# Patient Record
Sex: FEMALE
[De-identification: names, ages, dates, MRNs, and addresses within clinical notes are randomized per-mention and may not be internally consistent; named-entity substitution may affect disease eponyms.]

---

## 2017-06-28 ENCOUNTER — HOSPITAL ENCOUNTER (EMERGENCY)
Dept: HOSPITAL 5 - ED | Age: 72
Discharge: HOME | End: 2017-06-28
Payer: MEDICARE

## 2017-06-28 VITALS — SYSTOLIC BLOOD PRESSURE: 174 MMHG | DIASTOLIC BLOOD PRESSURE: 98 MMHG

## 2017-06-28 DIAGNOSIS — E78.00: ICD-10-CM

## 2017-06-28 DIAGNOSIS — R42: ICD-10-CM

## 2017-06-28 DIAGNOSIS — Z87.891: ICD-10-CM

## 2017-06-28 DIAGNOSIS — I10: Primary | ICD-10-CM

## 2017-06-28 LAB
ANION GAP SERPL CALC-SCNC: 18 MMOL/L
BASOPHILS NFR BLD AUTO: 0.6 % (ref 0–1.8)
BILIRUB UR QL STRIP: (no result)
BLOOD UR QL VISUAL: (no result)
BUN SERPL-MCNC: 12 MG/DL (ref 7–17)
BUN/CREAT SERPL: 24 %
CALCIUM SERPL-MCNC: 9.4 MG/DL (ref 8.4–10.2)
CHLORIDE SERPL-SCNC: 98.9 MMOL/L (ref 98–107)
CO2 SERPL-SCNC: 29 MMOL/L (ref 22–30)
EOSINOPHIL NFR BLD AUTO: 0.7 % (ref 0–4.3)
GLUCOSE SERPL-MCNC: 122 MG/DL (ref 65–100)
HCT VFR BLD CALC: 44.2 % (ref 30.3–42.9)
HGB BLD-MCNC: 14.4 GM/DL (ref 10.1–14.3)
KETONES UR STRIP-MCNC: (no result) MG/DL
LEUKOCYTE ESTERASE UR QL STRIP: (no result)
MCH RBC QN AUTO: 31 PG (ref 28–32)
MCHC RBC AUTO-ENTMCNC: 33 % (ref 30–34)
MCV RBC AUTO: 95 FL (ref 79–97)
MUCOUS THREADS #/AREA URNS HPF: (no result) /HPF
NITRITE UR QL STRIP: (no result)
PH UR STRIP: 7 [PH] (ref 5–7)
PLATELET # BLD: 169 K/MM3 (ref 140–440)
POTASSIUM SERPL-SCNC: 5.3 MMOL/L (ref 3.6–5)
PROT UR STRIP-MCNC: (no result) MG/DL
RBC # BLD AUTO: 4.67 M/MM3 (ref 3.65–5.03)
RBC #/AREA URNS HPF: 2 /HPF (ref 0–6)
SODIUM SERPL-SCNC: 141 MMOL/L (ref 137–145)
UROBILINOGEN UR-MCNC: < 2 MG/DL (ref ?–2)
WBC # BLD AUTO: 9.1 K/MM3 (ref 4.5–11)
WBC #/AREA URNS HPF: 1 /HPF (ref 0–6)

## 2017-06-28 PROCEDURE — 93005 ELECTROCARDIOGRAM TRACING: CPT

## 2017-06-28 PROCEDURE — 99283 EMERGENCY DEPT VISIT LOW MDM: CPT

## 2017-06-28 PROCEDURE — 36415 COLL VENOUS BLD VENIPUNCTURE: CPT

## 2017-06-28 PROCEDURE — 81001 URINALYSIS AUTO W/SCOPE: CPT

## 2017-06-28 PROCEDURE — 80048 BASIC METABOLIC PNL TOTAL CA: CPT

## 2017-06-28 PROCEDURE — 93010 ELECTROCARDIOGRAM REPORT: CPT

## 2017-06-28 PROCEDURE — 85025 COMPLETE CBC W/AUTO DIFF WBC: CPT

## 2017-06-28 NOTE — ADMIT CRITERIA FORM
Admission Criteria Documentation: 





                                                   HYPERTENSION





Clinical Indications for Admission to Inpatient Care


                                                    


                                                     


                                                                             (

[place]/check or initial the applicable condition/criteria)


                                                                       


Admission is indicated for 1 or more of the following(1)(2)(3)(4)(5)(6)(7)(8)(9)

(10):


[ ]I.   Hypertensive emergency, with evidence of acute and progressing target 

organ disease as indicated by


       1 or more of the following:


        [ ]a)          Hypertensive encephalopathy (e.g., confusion, altered 

mental status) (11)


        [ ]b)          Cerebral infarction


        [ ]c)   Intracranial hemorrhage


        [ ]d)   Myocardial ischemia or infarction


        [ ]e)   Heart failure (eg. Pulmonary edema)


        [ ]f)   Aortic dissection


        [ ]g)   Increased creatinine (new) with reduction of more than 50% in 

estimated glomerular filtration rate


      from baseline


        [ ]h)   Seizure


        [ ]i)   Papilledema


        [ ]j)   Retinal hemorrhage


        [ ]k)   Microangiopathic hemolytic anemia


        [ ]l)   Other significant finding secondary to hypertension


[ ]II.  Adrenergic or sympathomimetic crisis (e.g., severe hypertension due to 

pheochromocytoma crisis,


       cocaine, phencyclindine, or amphetamine intoxication, or clonidine 

withdrawal)


[X ]III.  Severe hypertension (SBP greater than 180 mmHg or DBP greater than 

110 mmHg or greater than the


        95th percentile for age, gender, and height in pediatric patients) that 

cannot be controlled (e.g., to SBP


        less than 160 mmHg and DBP less than 100 mmHg in adults) by treatment 

with oral medication in


        emergency department or observation care (12)











Extended stay beyond goal length of staymay be needed for(21)(22):


[ ]a)    Persistent hypertensive encephalopathy


[ ]b)    Continuation of pulmonary edema


[ ]c)    Recurring or persistent severe hypertension


[ ]d)    Target organ damage (eg, angina, stroke, aortic dissection)











The original [employer] content created by [employer] has been revised. 


The portions of the content which have been revised are identified through the 

use of italic text or in bold, and [employer] 


has neither reviewed nor approved the modified material. All other unmodified 

content is copyright  [employer].





Please see references footnoted in the original [employer] edition 

2016

## 2017-06-28 NOTE — EMERGENCY DEPARTMENT REPORT
ED Dizziness HPI





- General


Chief Complaint: Dizziness


Stated Complaint: B/P/DIZZINESS


Time Seen by Provider: 06/28/17 17:12


Source: patient


Mode of arrival: Ambulatory


Limitations: No Limitations





- History of Present Illness


Initial Comments: 





Pt is a 72 yr old F with a h/o HTN, HLD, and depression who presents with 

dizziness. Pt reports she had one episode of dizziness last night and another 

episode today. Associated nausea, no vomiting. Pt describes the dizziness as 

room spinning. Pt has not taken her medications today. Currently her symptoms 

have resolved. Otherwise no fevers, chills, HA, vomiting, CP, SOB, abd pain, 

falls, syncope, palpitations, extremity pains/swelling, travel, or sick contacts





- Related Data


 Home Medications











 Medication  Instructions  Recorded  Confirmed  Last Taken


 


Carvedilol [Coreg] 3.125 mg PO BID 08/06/15 06/28/17 06/27/17


 


Lisinopril [Zestril TAB] 5 mg PO QHS 08/06/15 06/28/17 06/27/17


 


Lisinopril/Hydrochlorothiazide 10 - 12.5 mg PO DAILY 08/06/15 06/28/17 06/27/17





[Zestoretic 10-12.5 mg]    


 


Lovastatin Tab [Mevacor] 10 mg PO QPM 08/06/15 06/28/17 06/27/17


 


Sertraline [Zoloft] 25 mg PO DAILY 08/06/15 06/28/17 06/27/17


 


Zolpidem [Ambien] 5 mg PO QHS 08/06/15 06/28/17 06/27/17








 Previous Rx's











 Medication  Instructions  Recorded  Last Taken  Type


 


Meclizine [Antivert] 25 mg PO TID PRN #20 tablet 06/28/17 Unknown Rx











 Allergies











Allergy/AdvReac Type Severity Reaction Status Date / Time


 


codeine AdvReac  Dizziness Verified 06/28/17 14:21














ED Review of Systems


ROS: 


Stated complaint: B/P/DIZZINESS


Other details as noted in HPI





Comment: All other systems reviewed and negative





ED Past Medical Hx





- Past Medical History


Hx Hypertension: Yes


Additional medical history: high cholesterol





- Surgical History


Additional Surgical History: TUBAL LIGATION





- Social History


Smoking Status: Former Smoker


Substance Use Type: None





- Medications


Home Medications: 


 Home Medications











 Medication  Instructions  Recorded  Confirmed  Last Taken  Type


 


Carvedilol [Coreg] 3.125 mg PO BID 08/06/15 06/28/17 06/27/17 History


 


Lisinopril [Zestril TAB] 5 mg PO QHS 08/06/15 06/28/17 06/27/17 History


 


Lisinopril/Hydrochlorothiazide 10 - 12.5 mg PO DAILY 08/06/15 06/28/17 06/27/17 

History





[Zestoretic 10-12.5 mg]     


 


Lovastatin Tab [Mevacor] 10 mg PO QPM 08/06/15 06/28/17 06/27/17 History


 


Sertraline [Zoloft] 25 mg PO DAILY 08/06/15 06/28/17 06/27/17 History


 


Zolpidem [Ambien] 5 mg PO QHS 08/06/15 06/28/17 06/27/17 History


 


Meclizine [Antivert] 25 mg PO TID PRN #20 tablet 06/28/17  Unknown Rx














ED Physical Exam





- General


Limitations: No Limitations


General appearance: alert, in no apparent distress





- Head


Head exam: Present: atraumatic, normocephalic





- Eye


Eye exam: Present: normal appearance





- ENT


ENT exam: Present: mucous membranes moist





- Neck


Neck exam: Present: normal inspection





- Respiratory


Respiratory exam: Present: normal lung sounds bilaterally.  Absent: respiratory 

distress





- Cardiovascular


Cardiovascular Exam: Present: regular rate, normal rhythm.  Absent: systolic 

murmur, diastolic murmur, rubs, gallop





- GI/Abdominal


GI/Abdominal exam: Present: soft, normal bowel sounds





- Extremities Exam


Extremities exam: Present: normal inspection





- Back Exam


Back exam: Present: normal inspection





- Neurological Exam


Neurological exam: Present: alert, oriented X3, CN II-XII intact, normal gait, 

reflexes normal, other (Walking in the ED without neurological dysfunction).  

Absent: motor sensory deficit





- Psychiatric


Psychiatric exam: Present: normal affect, normal mood





- Skin


Skin exam: Present: warm, dry, intact, normal color.  Absent: rash





ED Course


 Vital Signs











  06/28/17 06/28/17 06/28/17





  14:00 14:33 14:54


 


Temperature 97.6 F  


 


Pulse Rate 71 71 


 


Respiratory 17  





Rate   


 


Blood Pressure 224/118 224/118 


 


O2 Sat by Pulse 93  97





Oximetry   














  06/28/17 06/28/17 06/28/17





  15:00 15:05 15:10


 


Temperature   


 


Pulse Rate 76  73


 


Respiratory  18 21





Rate   


 


Blood Pressure 228/125  228/125


 


O2 Sat by Pulse  100 





Oximetry   














  06/28/17 06/28/17 06/28/17





  15:20 15:30 15:40


 


Temperature   


 


Pulse Rate 71 71 69


 


Respiratory 13 12 17





Rate   


 


Blood Pressure 196/105 196/105 186/102


 


O2 Sat by Pulse   





Oximetry   














  06/28/17 06/28/17 06/28/17





  15:50 16:00 16:10


 


Temperature   


 


Pulse Rate 67 69 70


 


Respiratory 15 18 16





Rate   


 


Blood Pressure 228/125 182/109 182/109


 


O2 Sat by Pulse   





Oximetry   














  06/28/17 06/28/17 06/28/17





  16:20 16:30 16:40


 


Temperature   


 


Pulse Rate 70 75 69


 


Respiratory 17 17 12





Rate   


 


Blood Pressure 188/106 188/106 185/110


 


O2 Sat by Pulse  96 95





Oximetry   














  06/28/17 06/28/17 06/28/17





  16:50 17:00 17:10


 


Temperature   


 


Pulse Rate 69 77 68


 


Respiratory 21 22 17





Rate   


 


Blood Pressure 185/110 189/106 189/106


 


O2 Sat by Pulse 93 94 96





Oximetry   














  06/28/17 06/28/17 06/28/17





  17:20 17:30 17:37


 


Temperature   


 


Pulse Rate 70 70 67


 


Respiratory 15 16 





Rate   


 


Blood Pressure 202/108 191/104 191/104


 


O2 Sat by Pulse 94 97 





Oximetry   














  06/28/17 06/28/17 06/28/17





  17:40 17:50 18:00


 


Temperature   


 


Pulse Rate 69 70 69


 


Respiratory 14 13 13





Rate   


 


Blood Pressure 178/103 178/103 181/111


 


O2 Sat by Pulse 95 96 96





Oximetry   














ED Medical Decision Making





- Lab Data


Result diagrams: 


 06/28/17 15:13





 06/28/17 15:13





- EKG Data


-: EKG Interpreted by Me





- EKG Data





06/28/17 17:16


EKG 1410 normal sinus rhythm at 68 bpm,  ms, normal axis, no LVH, no ST 

changes, no STEMI





- Medical Decision Making





Pt's K: 5.3 with no EKG changes for hyperkalemia





Currently asymptomatic and has normal gait and neuro exam, no need for head CT. 

Ordered meclizine 25mg PO





Pt's BP improved to 174/93 with lisinopril 10mg





Instructed patient to take her BP meds at home and to follow up with her PMD


Critical care attestation.: 


If time is entered above; I have spent that time in minutes in the direct care 

of this critically ill patient, excluding procedure time.








ED Disposition


Clinical Impression: 


 Dizziness, HTN (hypertension)





Disposition: DC-01 TO HOME OR SELFCARE


Is pt being admited?: No


Condition: Stable


Instructions:  Hypertension (ED), Vertigo (ED), Dizziness (ED)


Prescriptions: 


Meclizine [Antivert] 25 mg PO TID PRN #20 tablet


 PRN Reason: Vertigo


Referrals: 


PRIMARY CARE,MD [Primary Care Provider] - 3-5 Days

## 2017-08-22 ENCOUNTER — HOSPITAL ENCOUNTER (OUTPATIENT)
Dept: HOSPITAL 5 - CATHLABREC | Age: 72
Discharge: HOME | End: 2017-08-22
Attending: INTERNAL MEDICINE
Payer: MEDICARE

## 2017-08-22 VITALS — SYSTOLIC BLOOD PRESSURE: 128 MMHG | DIASTOLIC BLOOD PRESSURE: 89 MMHG

## 2017-08-22 DIAGNOSIS — Z79.899: ICD-10-CM

## 2017-08-22 DIAGNOSIS — Z80.1: ICD-10-CM

## 2017-08-22 DIAGNOSIS — R94.39: Primary | ICD-10-CM

## 2017-08-22 DIAGNOSIS — Z83.6: ICD-10-CM

## 2017-08-22 DIAGNOSIS — E66.9: ICD-10-CM

## 2017-08-22 DIAGNOSIS — E78.5: ICD-10-CM

## 2017-08-22 DIAGNOSIS — Z88.5: ICD-10-CM

## 2017-08-22 DIAGNOSIS — Z79.82: ICD-10-CM

## 2017-08-22 DIAGNOSIS — I25.10: ICD-10-CM

## 2017-08-22 DIAGNOSIS — I10: ICD-10-CM

## 2017-08-22 PROCEDURE — 93458 L HRT ARTERY/VENTRICLE ANGIO: CPT

## 2017-08-22 PROCEDURE — 92920 PRQ TRLUML C ANGIOP 1ART&/BR: CPT

## 2017-08-22 PROCEDURE — C1887 CATHETER, GUIDING: HCPCS

## 2017-08-22 PROCEDURE — C1894 INTRO/SHEATH, NON-LASER: HCPCS

## 2017-08-22 PROCEDURE — 93010 ELECTROCARDIOGRAM REPORT: CPT

## 2017-08-22 PROCEDURE — 93005 ELECTROCARDIOGRAM TRACING: CPT

## 2017-08-22 PROCEDURE — 85347 COAGULATION TIME ACTIVATED: CPT

## 2017-08-22 PROCEDURE — C1769 GUIDE WIRE: HCPCS

## 2017-08-22 RX ADMIN — HEPARIN SODIUM ONE UNIT: 1000 INJECTION, SOLUTION INTRAVENOUS; SUBCUTANEOUS at 08:54

## 2017-08-22 RX ADMIN — HEPARIN SODIUM ONE UNIT: 1000 INJECTION, SOLUTION INTRAVENOUS; SUBCUTANEOUS at 09:10

## 2017-08-22 NOTE — SHORT STAY SUMMARY
Short Stay Documentation


Date of service: 08/22/17





- History


H&P: obtained from office





- Allergies and Medications


Current Medications: 


 Allergies





codeine Adverse Reaction (Verified 06/28/17 14:21)


 Dizziness





 Home Medications











 Medication  Instructions  Recorded  Confirmed  Last Taken  Type


 


Carvedilol [Coreg] 12.5 mg PO BID 08/06/15 08/22/17 08/22/17 05:30 History


 


Lisinopril [Zestril TAB] 20 mg PO QPM 08/06/15 08/22/17 08/21/17 History





    b 


 


Lisinopril/Hydrochlorothiazide 20 - 25 mg PO QAM 08/06/15 06/28/17 08/22/17 05:

30 History





[Zestoretic 10-12.5 mg]     


 


Lovastatin Tab [Mevacor] 20 mg PO QPM 08/06/15 08/22/17 08/21/17 History


 


Sertraline [Zoloft] 50 mg PO DAILY 08/06/15 08/22/17 08/21/17 History


 


Zolpidem [Ambien] 10 mg PO QHS 08/06/15 08/22/17 08/21/17 History


 


Meclizine [Antivert] 25 mg PO TID PRN #20 tablet 06/28/17  Unknown Rx


 


Aspirin [Adult Low Dose Aspirin EC] 81 mg PO DAILY 08/22/17 08/22/17 08/21/17 

History








Active Medications





Sodium Chloride (Nacl 0.9% 500 Ml)  500 mls @ 50 mls/hr IV AS DIRECT BRANDY


   Stop: 08/22/17 16:59


   Last Admin: 08/22/17 07:16 Dose:  50 mls/hr











- Brief post op/procedure progress note


Date of procedure: 08/22/17


Pre-op diagnosis: abnormal stress test


Post-op diagnosis: same


Procedure: 





C - see cath report


Anesthesia: local


Estimated blood loss: none


Condition: stable





- Disposition


Condition at discharge: Stable


Disposition: DC-01 TO HOME OR SELFCARE





- Discharge Diagnoses


(1) Abnormal stress test


Status: Suspected   





(2) Hypertension


Status: Chronic   


Qualifiers: 


   Hypertension type: H 





(3) Hyperlipidemia


Status: Chronic   


Qualifiers: 


   Hyperlipidemia type: H 





(4) Obesity


Status: Chronic   


Qualifiers: 


   Obesity type: O   Obesity classification: O   Serious obesity comorbidity 

presence: S   Body mass index: B 





(5) Dyspnea on exertion


Status: Chronic   





(6) CAD (coronary artery disease)


Status: Chronic   


Qualifiers: 


   Coronary Disease-Associated Artery/Lesion type: C   Native vs. transplanted 

heart: N   Associated angina: A 





Short Stay Discharge Plan


Activity: advance as tolerated


Diet: low fat, low cholesterol, low salt


Wound: open to air, keep clean and dry, per your surgeon's advice


Follow up with: 


LOIS NAVARRO MD [Staff Physician] - 7 Days ([place] office, 9/6/2017 @ 

10:45AM )

## 2017-08-22 NOTE — CARDIAC CATHERIZATION REPORT
LEFT HEART CATHETERIZATION 



REFERRING PHYSICIAN:  Navjot Aguilera MD 



INDICATION FOR PROCEDURE:  The patient is a pleasant 72-year-old 

female with a history of chest pain, abnormal stress test, referred for left

heart catheterization.  Risks, benefits, and potential alternatives explained at

length prior to obtaining informed consent.



PROCEDURE IN DETAIL:  The patient was brought to the cath lab in a

postabsorptive state, prepped and draped in sterile fashion.  Daniel's test in

right hand was normal.  A 2 mL of 2% lidocaine used to anesthetize the right

wrist.  A standard 6-[place]oven[place] hydrophilic sheath used to cannulate the right

radial artery via modified Seldinger technique.  All exchanges performed to

exchange a J-tip guidewire.  JL3.5 catheter used to engage the left main.  No

dampening or ventricularization.  Cineangiography performed in all projections. 

JR4 catheter used to cross the aortic valve under fluoroscopic guidance.  Left

ventriculography performed in 30 RIDDLE and 30 [place] projection via hand injections. 

Catheter flushed.  Manual pullback performed with continuous pressure

monitoring.  Catheter used to engage the right coronary.  No dampening or

ventricularization.  Cineangiography performed in all projections.  Next,

catheter removed from the body of wire.



DATA:  Aortic pressure is 150/70, LV pressure is 150, LVEDP of 15 mmHg.  Left

ventriculography revealed normal systolic performance with estimated ejection

fraction of 55% to 60%.  No evidence of aortic stenosis.  The patient remained

in normal sinus rhythm throughout the procedure.



CORONARY ANATOMY:  It is a codominant system.  Left main without significant

disease, bifurcates into left anterior descending and left circumflex.  Left

circumflex is a moderate sized vessel, courses AV groove, _____ small left PDA. 

No significant disease.  LAD is a moderate sized vessel, courses anterior

intergroove, wraps around the apex, no significant disease in the LAD or

diagonal system.  Right coronary is a small to moderate sized vessel, courses

the AV groove, distally bifurcates in the posterior descending and

posterolateral branches.  There is an ulcerated subtotal occlusion of the distal

right coronary with CHASITY 2 flow in the distal right PDA.  This is a bizarre

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  It does not appear like a

usual chronic total occlusion.  No collaterals from the left are identified. 

Given this, we decided to proceed with a PCI attempt.  A JR4 guide used to

engage the right coronary.  No difficulty.  No dampening.  Heparin given. 

Abnormal ACT confirmed.  I used multiple wires to cross the lesion without

success.  The lesion was very tortuous and from a wire standpoint behaved like a

chronic total occlusion.  There were no complications.  The patient remained

chest pain free.  CHASITY 2 flow remains in the vessel.  



At this point, given unsuccessful attempt PCI despite multiple try to optimize

medical therapy, discussed with the patient's family and referring physician,

Dr. Agiulera should she fail intensified medical therapy, would consider repeat

reattempt at [facility] for chronic total occlusion.  The patient is

clinically stable, chest pain free.  Follow up with Dr. Aguilera in 2 weeks.





DD: 08/22/2017 09:27

DT: 08/22/2017 10:23

JOB# [id number]  [id number]

SBLUX/LAURA

## 2020-07-29 ENCOUNTER — HOSPITAL ENCOUNTER (OUTPATIENT)
Dept: HOSPITAL 5 - ED | Age: 75
Setting detail: OBSERVATION
LOS: 3 days | Discharge: HOME HEALTH | End: 2020-08-01
Attending: INTERNAL MEDICINE | Admitting: INTERNAL MEDICINE
Payer: MEDICARE

## 2020-07-29 DIAGNOSIS — I25.10: ICD-10-CM

## 2020-07-29 DIAGNOSIS — E78.5: ICD-10-CM

## 2020-07-29 DIAGNOSIS — J45.902: ICD-10-CM

## 2020-07-29 DIAGNOSIS — Z98.51: ICD-10-CM

## 2020-07-29 DIAGNOSIS — Z79.82: ICD-10-CM

## 2020-07-29 DIAGNOSIS — F41.9: ICD-10-CM

## 2020-07-29 DIAGNOSIS — E66.9: ICD-10-CM

## 2020-07-29 DIAGNOSIS — N17.9: ICD-10-CM

## 2020-07-29 DIAGNOSIS — M19.90: ICD-10-CM

## 2020-07-29 DIAGNOSIS — J96.01: Primary | ICD-10-CM

## 2020-07-29 DIAGNOSIS — K21.9: ICD-10-CM

## 2020-07-29 DIAGNOSIS — I10: ICD-10-CM

## 2020-07-29 LAB
ALBUMIN SERPL-MCNC: 3.9 G/DL (ref 3.9–5)
ALT SERPL-CCNC: 15 UNITS/L (ref 7–56)
BASOPHILS # (AUTO): 0 K/MM3 (ref 0–0.1)
BASOPHILS NFR BLD AUTO: 0.5 % (ref 0–1.8)
BUN SERPL-MCNC: 11 MG/DL (ref 7–17)
BUN/CREAT SERPL: 8 %
CALCIUM SERPL-MCNC: 9.3 MG/DL (ref 8.4–10.2)
EOSINOPHIL # BLD AUTO: 0.1 K/MM3 (ref 0–0.4)
EOSINOPHIL NFR BLD AUTO: 1.4 % (ref 0–4.3)
HCT VFR BLD CALC: 41.7 % (ref 30.3–42.9)
HEMOLYSIS INDEX: 6
HGB BLD-MCNC: 14.3 GM/DL (ref 10.1–14.3)
LYMPHOCYTES # BLD AUTO: 0.8 K/MM3 (ref 1.2–5.4)
LYMPHOCYTES NFR BLD AUTO: 9.6 % (ref 13.4–35)
MCHC RBC AUTO-ENTMCNC: 34 % (ref 30–34)
MCV RBC AUTO: 95 FL (ref 79–97)
MONOCYTES # (AUTO): 0.7 K/MM3 (ref 0–0.8)
MONOCYTES % (AUTO): 7.6 % (ref 0–7.3)
PLATELET # BLD: 159 K/MM3 (ref 140–440)
RBC # BLD AUTO: 4.39 M/MM3 (ref 3.65–5.03)

## 2020-07-29 PROCEDURE — 85379 FIBRIN DEGRADATION QUANT: CPT

## 2020-07-29 PROCEDURE — 36415 COLL VENOUS BLD VENIPUNCTURE: CPT

## 2020-07-29 PROCEDURE — 85025 COMPLETE CBC W/AUTO DIFF WBC: CPT

## 2020-07-29 PROCEDURE — 82550 ASSAY OF CK (CPK): CPT

## 2020-07-29 PROCEDURE — 71045 X-RAY EXAM CHEST 1 VIEW: CPT

## 2020-07-29 PROCEDURE — 84484 ASSAY OF TROPONIN QUANT: CPT

## 2020-07-29 PROCEDURE — 82553 CREATINE MB FRACTION: CPT

## 2020-07-29 PROCEDURE — 94644 CONT INHLJ TX 1ST HOUR: CPT

## 2020-07-29 PROCEDURE — 96374 THER/PROPH/DIAG INJ IV PUSH: CPT

## 2020-07-29 PROCEDURE — 80048 BASIC METABOLIC PNL TOTAL CA: CPT

## 2020-07-29 PROCEDURE — 96376 TX/PRO/DX INJ SAME DRUG ADON: CPT

## 2020-07-29 PROCEDURE — G0378 HOSPITAL OBSERVATION PER HR: HCPCS

## 2020-07-29 PROCEDURE — 96372 THER/PROPH/DIAG INJ SC/IM: CPT

## 2020-07-29 PROCEDURE — 99291 CRITICAL CARE FIRST HOUR: CPT

## 2020-07-29 PROCEDURE — 80053 COMPREHEN METABOLIC PANEL: CPT

## 2020-07-29 PROCEDURE — 94760 N-INVAS EAR/PLS OXIMETRY 1: CPT

## 2020-07-29 PROCEDURE — 94640 AIRWAY INHALATION TREATMENT: CPT

## 2020-07-29 PROCEDURE — 93005 ELECTROCARDIOGRAM TRACING: CPT

## 2020-07-29 NOTE — EMERGENCY DEPARTMENT REPORT
ED Shortness of Breath HPI





- General


Stated Complaint: DIFFICULTY BREATHING


Time Seen by Provider: 07/29/20 22:08


Source: patient, EMS


Mode of arrival: Stretcher


Limitations: No Limitations





- History of Present Illness


Initial Comments: 





Patient is a 75-year-old female that presents emergency room with complaints of 

shortness of breath.  Patient states she is been short of breath for a few 

months and has been seeing her primary care but but was only with extreme 

exertion.  Patient states that last night the shortness of breath acutely 

worsened and more constant and all the time.  Patient states she is short of 

breath at rest.  Patient states that she also has dyspnea on exertion.  Patient 

denies fever and chills.  Patient denies cough.  Patient denies chest pain.  

Patient states she has been referred to a cardiologist and a pulmonologist.  

Patient states that her shortness of breath is better with rest and worse with 

exertion and walking and talking.  Patient complains of wheezing.  Patient 

denies past medical history of COPD.  Patient denies smoking.  Patient states 

she had an echo and an NST and they were normal. 








Patient denies recent travel.  Patient denies recent international travel.  

Patient denies exposure to the novel coronavirus.  Patient denies sick contacts.

 Patient denies fever and chills.  Patient denies cough.  Patient denies 

diarrhea.  Patient denies coming in contact with anybody with symptoms of the 

novel coronavirus.





MD Complaint: shortness of breath


-: Sudden


Severity: severe


Consistency: constant


Improves With: rest


Worsens With: exertion, inspiration





- Related Data


                                Home Medications











 Medication  Instructions  Recorded  Confirmed  Last Taken


 


Lisinopril/Hydrochlorothiazide 20 - 25 mg PO QAM 08/06/15 06/28/17 08/22/17 

05:30





[Zestoretic 10-12.5 mg]    


 


Lovastatin Tab [Mevacor] 20 mg PO QPM 08/06/15 08/22/17 08/21/17


 


Sertraline [Zoloft] 50 mg PO DAILY 08/06/15 08/22/17 08/21/17


 


Zolpidem [Ambien] 10 mg PO QHS 08/06/15 08/22/17 08/21/17


 


carvediloL [Coreg] 12.5 mg PO BID 08/06/15 08/22/17 08/22/17 05:30


 


lisinopriL [Zestril TAB] 20 mg PO QPM 08/06/15 08/22/17 08/21/17





     b


 


Aspirin [Adult Low Dose Aspirin EC] 81 mg PO DAILY 08/22/17 08/22/17 08/21/17








                                  Previous Rx's











 Medication  Instructions  Recorded  Last Taken  Type


 


Meclizine [Antivert] 25 mg PO TID PRN #20 tablet 06/28/17 Unknown Rx











                                    Allergies











Allergy/AdvReac Type Severity Reaction Status Date / Time


 


codeine AdvReac  Dizziness Verified 06/28/17 14:21














ED Review of Systems


ROS: 


Stated complaint: DIFFICULTY BREATHING


Other details as noted in HPI





Constitutional: denies: chills, fever


Eyes: denies: eye pain, eye discharge, vision change


ENT: denies: ear pain, throat pain


Respiratory: shortness of breath, SOB with exertion, SOB at rest.  denies: 

cough, wheezing


Cardiovascular: dyspnea on exertion.  denies: chest pain, palpitations


Endocrine: no symptoms reported


Gastrointestinal: denies: abdominal pain, nausea, diarrhea


Genitourinary: denies: urgency, dysuria, discharge


Musculoskeletal: denies: back pain, joint swelling, arthralgia


Skin: denies: rash, lesions


Neurological: denies: headache, weakness, paresthesias


Psychiatric: denies: anxiety, depression


Hematological/Lymphatic: denies: easy bleeding, easy bruising





ED Past Medical Hx





- Past Medical History


Previous Medical History?: Yes


Hx Hypertension: Yes


Hx Heart Attack/AMI: No


Hx Congestive Heart Failure: No


Hx GERD: Yes


Hx Renal Disease: No


Hx Arthritis: Yes (knees; had couple of injection)


Hx Asthma: No


Hx COPD: No


Hx HIV: No


Additional medical history: high cholesterol





- Surgical History


Past Surgical History?: Yes


Additional Surgical History: TUBAL LIGATION





- Family History


Family history: no significant





- Social History


Smoking Status: Never Smoker


Substance Use Type: None





- Medications


Home Medications: 


                                Home Medications











 Medication  Instructions  Recorded  Confirmed  Last Taken  Type


 


Lisinopril/Hydrochlorothiazide 20 - 25 mg PO QAM 08/06/15 06/28/17 08/22/17 

05:30 History





[Zestoretic 10-12.5 mg]     


 


Lovastatin Tab [Mevacor] 20 mg PO QPM 08/06/15 08/22/17 08/21/17 History


 


Sertraline [Zoloft] 50 mg PO DAILY 08/06/15 08/22/17 08/21/17 History


 


Zolpidem [Ambien] 10 mg PO QHS 08/06/15 08/22/17 08/21/17 History


 


carvediloL [Coreg] 12.5 mg PO BID 08/06/15 08/22/17 08/22/17 05:30 History


 


lisinopriL [Zestril TAB] 20 mg PO QPM 08/06/15 08/22/17 08/21/17 History





     b 


 


Meclizine [Antivert] 25 mg PO TID PRN #20 tablet 06/28/17  Unknown Rx


 


Aspirin [Adult Low Dose Aspirin EC] 81 mg PO DAILY 08/22/17 08/22/17 08/21/17 

History














ED Physical Exam





- General


Limitations: No Limitations


General appearance: alert, in distress





- Head


Head exam: Present: atraumatic, normocephalic





- Eye


Eye exam: Present: normal appearance





- ENT


ENT exam: Present: mucous membranes moist





- Neck


Neck exam: Present: normal inspection





- Respiratory


Respiratory exam: Present: respiratory distress, wheezes, decreased breath 

sounds





- Cardiovascular


Cardiovascular Exam: Present: regular rate, normal rhythm.  Absent: systolic 

murmur, diastolic murmur, rubs, gallop





- GI/Abdominal


GI/Abdominal exam: Present: soft, normal bowel sounds.  Absent: distended, 

tenderness, guarding





- Extremities Exam


Extremities exam: Present: normal inspection





- Back Exam


Back exam: Present: normal inspection





- Neurological Exam


Neurological exam: Present: alert, oriented X3





- Psychiatric


Psychiatric exam: Present: normal affect, normal mood





- Skin


Skin exam: Present: warm, dry, intact, normal color.  Absent: rash





ED Course


                                   Vital Signs











  07/29/20 07/29/20 07/30/20





  21:55 23:45 01:01


 


Temperature 98.4 F  


 


Pulse Rate 76  73


 


Pulse Rate [  77 





Bilateral]   


 


Respiratory 21  18





Rate   


 


Respiratory  22 





Rate [Bilateral   





]   


 


Blood Pressure 149/87  141/73





[Right]   


 


O2 Sat by Pulse 97  97





Oximetry   














- Reevaluation(s)


Reevaluation #1: 


Initial evaluation done.  Patient is already received magnesium and Solu-Medrol 

and albuterol from EMS.  Patient was given a DuoNeb.  Patient continues to 

wheeze.  Patient also found to be hypoxic and placed on 4 L.  Patient oxygen 

saturation on 4 L 96%.


07/39/20 22:31











Reevaluation #2: 


Patient states she is feeling a little bit better.  Patient still requiring 

supplemental oxygen.





I discussed all results with patient.  I discussed plan of care with patient.  

Patient agrees with plan of care and admission.  Patient to be admitted to the 

hospitalist service.


07/30/20 00:32








- Consultations


Consultation #1: 


Hospitalist consulted for admission.  Hospitalist to admit patient.


07/30/20 00:33








ED Medical Decision Making





- Lab Data


Result diagrams: 


                                 07/29/20 22:22





                                 07/29/20 22:22





- EKG Data


-: EKG Interpreted by Me


EKG shows normal: sinus rhythm, axis, intervals, QRS complexes, ST-T waves


Rate: normal





- Radiology Data


Radiology results: report reviewed








 CHEST 1 VIEW 





INDICATION: 


Dyspnea. 





COMPARISON: 


8/6/2015. 





FINDINGS: 


Support devices: None. 





Heart: Within normal limits. 


Lungs/Pleura: No acute air space or interstitial disease. 





Additional findings: None. 





IMPRESSION: No acute abnormality








- Medical Decision Making





Patient is a 75-year-old female that presents emergency room with acute 

worsening of shortness of breath.  Patient that she has had mild shortness of 

breath for a few months.  Patient states her primary care sent her to a 

cardiologist she had normal echo and stress test.  Patient brought in by EMS and

 EMS gave the patient magnesium, Solu-Medrol and albuterol run.  Patient 

continued to wheeze and patient was given a DuoNeb here.  Patient responded well

 to DuoNeb.  Patient required supplemental oxygen of 4 L for hypoxia.  Patient 

had a chest x-ray was negative for acute findings.  Patient is d-dimer negative.

  Patient's cardiac enzymes negative.  Patient's labs were remarkable for acute 

renal failure.  Patient has no history of renal disease.  Patient has no history

 of COPD.  Patient will findings are consistent with a reactive airway, status 

asthmaticus, hypoxia, respiratory failure, renal failure, renal insufficiency.  

Patient admitted to the hospitalist service for further evaluation treatment.





- Differential Diagnosis


S OB, status asthmaticus, COPD, pneumonia, wheezing, hypoxia


Critical Care Time: Yes


Critical care time in (mins) excluding proc time.: 35


Critical care attestation.: 


If time is entered above; I have spent that time in minutes in the direct care 

of this critically ill patient, excluding procedure time.





Critical Care Time: 





35 minutes





ED Disposition


Clinical Impression: 


 Dyspnea on exertion, SOB (shortness of breath), Wheezing, Renal insufficiency, 

Hypoxia





Status asthmaticus


Qualifiers:


 Asthma severity: unspecified severity Asthma persistence: unspecified Qualified

 Code(s): J45.902 - Unspecified asthma with status asthmaticus





Renal failure


Qualifiers:


 Renal failure chronicity: acute Acute renal failure type: unspecified Qualified

 Code(s): N17.9 - Acute kidney failure, unspecified





Respiratory failure


Qualifiers:


 Chronicity: acute Respiratory failure complication: hypoxia Qualified Code(s): 

J96.01 - Acute respiratory failure with hypoxia





Disposition: DC-09 OP ADMIT IP TO THIS HOSP


Is pt being admited?: Yes


Does the pt Need Aspirin: No


Condition: Critical


Time of Disposition: 00:32

## 2020-07-29 NOTE — XRAY REPORT
CHEST 1 VIEW 



INDICATION: 

Dyspnea.



COMPARISON: 

8/6/2015.



FINDINGS:

Support devices: None.



Heart: Within normal limits. 

Lungs/Pleura: No acute air space or interstitial disease. 



Additional findings: None.



IMPRESSION: No acute abnormality.



Signer Name: Clarence Bartlett MD 

Signed: 7/29/2020 10:31 PM

Workstation Name: [id number]

## 2020-07-30 RX ADMIN — HEPARIN SODIUM SCH UNIT: 5000 INJECTION, SOLUTION INTRAVENOUS; SUBCUTANEOUS at 09:06

## 2020-07-30 RX ADMIN — ALBUTEROL SULFATE SCH MG: 2.5 SOLUTION RESPIRATORY (INHALATION) at 21:30

## 2020-07-30 RX ADMIN — ASPIRIN SCH MG: 81 TABLET, COATED ORAL at 09:05

## 2020-07-30 RX ADMIN — ALBUTEROL SULFATE SCH MG: 2.5 SOLUTION RESPIRATORY (INHALATION) at 07:26

## 2020-07-30 RX ADMIN — SERTRALINE SCH MG: 25 TABLET, FILM COATED ORAL at 09:05

## 2020-07-30 RX ADMIN — LISINOPRIL SCH MG: 10 TABLET ORAL at 09:06

## 2020-07-30 RX ADMIN — ZOLPIDEM TARTRATE SCH MG: 5 TABLET ORAL at 21:43

## 2020-07-30 RX ADMIN — METHYLPREDNISOLONE SODIUM SUCCINATE SCH MG: 125 INJECTION, POWDER, FOR SOLUTION INTRAMUSCULAR; INTRAVENOUS at 21:44

## 2020-07-30 RX ADMIN — METHYLPREDNISOLONE SODIUM SUCCINATE SCH MG: 125 INJECTION, POWDER, FOR SOLUTION INTRAMUSCULAR; INTRAVENOUS at 08:30

## 2020-07-30 RX ADMIN — HEPARIN SODIUM SCH UNIT: 5000 INJECTION, SOLUTION INTRAVENOUS; SUBCUTANEOUS at 21:43

## 2020-07-30 RX ADMIN — ALBUTEROL SULFATE SCH MG: 2.5 SOLUTION RESPIRATORY (INHALATION) at 14:34

## 2020-07-30 RX ADMIN — METHYLPREDNISOLONE SODIUM SUCCINATE SCH MG: 125 INJECTION, POWDER, FOR SOLUTION INTRAMUSCULAR; INTRAVENOUS at 13:27

## 2020-07-30 NOTE — EVENT NOTE
Date: 07/30/20


Patient seen and examined at the bedside this morning


Admitted this morning with acute hypoxic respiratory failure,With room air O2 

sats of 75% recorded by EMS 


which improved with 3 L nasal cannula oxygen, magnesium and IV Solu-Medrol


Patient also has acute kidney injury, agree with the current management, consult

pulmonary if no improvement


Monitor closely and adjust management as needed, Plan of care reviewed with the 

patient and her nurse

## 2020-07-30 NOTE — HISTORY AND PHYSICAL REPORT
History of Present Illness


Date of examination: 07/30/20


Date of admission: 


07/30/20 00:45





Chief complaint: 





Shortness of breath 


History of present illness: 





75 year old female presenting with shortness of breath and wheezing. There is no

history of cough , fever, chills or chest pain, there is no nausea and vomiting.

Symptom has been going on for few days and worse with exertion.





Past History


Past Medical History: arthritis, GERD, hypertension, hyperlipidemia, other 

(SEASONAL ALLERGY, ANXIETY)


Past Surgical History: Other (TUBAL LIGATION)


Social history: no significant social history


Family history: no significant family history





Medications and Allergies


                                    Allergies











Allergy/AdvReac Type Severity Reaction Status Date / Time


 


codeine AdvReac  Dizziness Verified 06/28/17 14:21











                                Home Medications











 Medication  Instructions  Recorded  Confirmed  Last Taken  Type


 


Lisinopril/Hydrochlorothiazide 20 - 25 mg PO QAM 08/06/15 06/28/17 08/22/17 

05:30 History





[Zestoretic 10-12.5 mg]     


 


Lovastatin Tab [Mevacor] 20 mg PO QPM 08/06/15 08/22/17 08/21/17 History


 


Sertraline [Zoloft] 50 mg PO DAILY 08/06/15 08/22/17 08/21/17 History


 


Zolpidem [Ambien] 10 mg PO QHS 08/06/15 08/22/17 08/21/17 History


 


carvediloL [Coreg] 12.5 mg PO BID 08/06/15 08/22/17 08/22/17 05:30 History


 


lisinopriL [Zestril TAB] 20 mg PO QPM 08/06/15 08/22/17 08/21/17 History





     b 


 


Meclizine [Antivert] 25 mg PO TID PRN #20 tablet 06/28/17  Unknown Rx


 


Aspirin [Adult Low Dose Aspirin EC] 81 mg PO DAILY 08/22/17 08/22/17 08/21/17 

History











Active Meds: 


Active Medications





Albuterol (Proventil)  2.5 mg IH Q6HRT [facility]


Aspirin (Halfprin Ec)  81 mg PO DAILY [facility]


Carvedilol (Coreg)  12.5 mg PO BID [facility]


Guaifenesin (Robitussin)  200 mg PO Q4H PRN


   PRN Reason: Cough


Heparin Sodium (Porcine) (Heparin)  5,000 unit SUB-Q Q12HR [facility]


Hydrochlorothiazide (Hctz)  12.5 mg PO QDAY [facility]


Lisinopril (Zestril)  10 mg PO QDAY [facility]


Meclizine HCl (Antivert)  25 mg PO TID PRN


   PRN Reason: Vertigo


Methylprednisolone Sodium Succinate (Solu-Medrol)  60 mg IV Q8HR [facility]


Miscellaneous Medication (Lisinopril/Hydrochlorothiazide [Zestoretic 10-12.5 

Mg])  20 - 25 mg PO QAM [facility]


Miscellaneous Medication (Lovastatin Tab [Mevacor])  20 mg PO QPM [facility]


Sertraline HCl (Zoloft)  50 mg PO DAILY [facility]


Zolpidem Tartrate (Ambien)  10 mg PO QHS [facility]











Review of Systems


Constitutional: no weight gain, no fever, no sweats, no weakness, no malaise


Eyes: bilateral: other (NO BILATERAL EYE SYMPTOM)


Ears, nose, mouth and throat: no ear pain, no ear discharge, no decreased 

hearing, no nose pain, no nasal congestion, no nasal discharge, no sinus 

pressure, no mouth pain, no dysphagia, no hoarseness, no sore throat, no 

swelling in mouth


Breasts: deferred


Cardiovascular: shortness of breath, dyspnea on exertion, no chest pain, no 

orthopnea, no palpitations, no rapid/irregular heart beat, no edema, no syncope,

 no lightheadedness


Respiratory: shortness of breath, wheezing, no cough, no cough with sputum, no 

excessive sputum, no hemoptysis, no congestion, no pleurisy, no pain, no pain on

 inspiration


Gastrointestinal: no nausea, no vomiting, no diarrhea, no constipation, no 

change in bowel habits, no hematemesis, no melena, no hematochezia


Genitourinary Female: no pregnant


Menstruation: postmenopausal


Rectal: no pain, no itching


Musculoskeletal: no neck stiffness, no neck pain, no shooting arm pain, no arm n

umbness/tingling, no low back pain, no shooting leg pain, no muscle weakness, no

 muscle cramps, no myalgias


Integumentary: no rash, no pruritis, no redness, no sores, no wounds, no 

jaundice, no lesions


Neurological: no head injury, no paralysis, no weakness, no parathesias, no 

vertigo, no headaches, no migraines


Psychiatric: no anxiety, no memory loss, no sleep disturbances, no hopelessness


Endocrine: no cold intolerance, no heat intolerance, no excessive thirst, no 

polydipsia, no excessive sweating, no thyroid mass, no palpatations


Hematologic/Lymphatic: no easy bruising, no easy bleeding, no lymphadenopathy, 

no lymphedema


Allergic/Immunologic: no urticaria





Exam





- Constitutional


Vitals: 


                                        











Temp Pulse Resp BP Pulse Ox


 


 97.8 F   75   18   152/91   93 


 


 07/30/20 06:27  07/30/20 06:27  07/30/20 06:27  07/30/20 06:27  07/30/20 06:27











General appearance: Present: mild distress





- EENT


Eyes: Present: PERRL, EOM intact


ENT: hearing intact, clear oral mucosa, dentition normal, hearing decreased





- Neck


Neck: Present: supple, normal ROM.  Absent: rigidity, enlarged thyroid, masses 

or JVD, carotid bruits





- Respiratory


Respiratory effort: normal





- Cardiovascular


Rhythm: regular


Heart Sounds: Present: S1 & S2.  Absent: gallop, systolic murmur, diastolic 

murmur, click





- Extremities


Extremities: no ischemia, No edema


Peripheral Pulses: within normal limits





- Abdominal


General gastrointestinal: Present: soft, non-tender, non-distended.  Absent: 

tender, distended, hepatomegaly, splenomegaly


Female genitourinary: Present: deferred





- Rectal


Rectal Exam: deferred





- Integumentary


Integumentary: Present: clear, warm, dry.  Absent: erythema





- Musculoskeletal


Musculoskeletal: strength equal bilaterally





- Psychiatric


Psychiatric: appropriate mood/affect





HEART Score





- HEART Score


Age: > 65


Risk factors: 1-2 risk factors


Troponin: 


                                        











Troponin T  < 0.010 ng/mL (0.00-0.029)   07/29/20  22:22    











Troponin: < normal limit





- Critical Actions


Critical Actions: 0-3 pts:0.9-1.7%risk of adverse cardiac event.Candidate for 

discharge





Results





- Labs


CBC & Chem 7: 


                                 07/29/20 22:22





                                 07/29/20 22:22


Labs: 


                             Laboratory Last Values











WBC  8.8 K/mm3 (4.5-11.0)   07/29/20  22:22    


 


RBC  4.39 M/mm3 (3.65-5.03)   07/29/20  22:22    


 


Hgb  14.3 gm/dl (10.1-14.3)   07/29/20  22:22    


 


Hct  41.7 % (30.3-42.9)   07/29/20  22:22    


 


MCV  95 fl (79-97)   07/29/20  22:22    


 


MCH  33 pg (28-32)  H  07/29/20  22:22    


 


MCHC  34 % (30-34)   07/29/20  22:22    


 


RDW  13.6 % (13.2-15.2)   07/29/20  22:22    


 


Plt Count  159 K/mm3 (140-440)   07/29/20  22:22    


 


Lymph % (Auto)  9.6 % (13.4-35.0)  L  07/29/20  22:22    


 


Mono % (Auto)  7.6 % (0.0-7.3)  H  07/29/20  22:22    


 


Eos % (Auto)  1.4 % (0.0-4.3)   07/29/20  22:22    


 


Baso % (Auto)  0.5 % (0.0-1.8)   07/29/20  22:22    


 


Lymph #  0.8 K/mm3 (1.2-5.4)  L  07/29/20  22:22    


 


Mono #  0.7 K/mm3 (0.0-0.8)   07/29/20  22:22    


 


Eos #  0.1 K/mm3 (0.0-0.4)   07/29/20  22:22    


 


Baso #  0.0 K/mm3 (0.0-0.1)   07/29/20  22:22    


 


Seg Neutrophils %  80.9 % (40.0-70.0)  H  07/29/20  22:22    


 


Seg Neutrophils #  7.1 K/mm3 (1.8-7.7)   07/29/20  22:22    


 


D-Dimer  169.50 ng/mlDDU (0-234)   07/29/20  22:22    


 


Sodium  139 mmol/L (137-145)   07/29/20  22:22    


 


Potassium  5.0 mmol/L (3.6-5.0)   07/29/20  22:22    


 


Chloride  96.1 mmol/L ()  L  07/29/20  22:22    


 


Carbon Dioxide  36 mmol/L (22-30)  H  07/29/20  22:22    


 


Anion Gap  12 mmol/L  07/29/20  22:22    


 


BUN  11 mg/dL (7-17)   07/29/20  22:22    


 


Creatinine  1.4 mg/dL (0.7-1.2)  H  07/29/20  22:22    


 


Estimated GFR  37 ml/min  07/29/20  22:22    


 


BUN/Creatinine Ratio  8 %  07/29/20  22:22    


 


Glucose  143 mg/dL ()  H  07/29/20  22:22    


 


Calcium  9.3 mg/dL (8.4-10.2)   07/29/20  22:22    


 


Total Bilirubin  0.30 mg/dL (0.1-1.2)   07/29/20  22:22    


 


AST  15 units/L (5-40)   07/29/20  22:22    


 


ALT  15 units/L (7-56)   07/29/20  22:22    


 


Alkaline Phosphatase  98 units/L ()   07/29/20  22:22    


 


Total Creatine Kinase  57 units/L ()   07/29/20  22:22    


 


CK-MB (CK-2)  2.3 ng/mL (0.0-4.0)   07/29/20  22:22    


 


CK-MB (CK-2) Rel Index  4.0  (0-4)   07/29/20  22:22    


 


Troponin T  < 0.010 ng/mL (0.00-0.029)   07/29/20  22:22    


 


Total Protein  6.8 g/dL (6.3-8.2)   07/29/20  22:22    


 


Albumin  3.9 g/dL (3.9-5)   07/29/20  22:22    


 


Albumin/Globulin Ratio  1.3 %  07/29/20  22:22    











Loza/IV: 


                                        





Voiding Method                   Toilet


IV Catheter Type [Left           Peripheral IV


Antecubital]                     











Assessment and Plan





- Patient Problems


(1) Reactive airway disease


Current Visit: Yes   Status: Acute   


Plan to address problem: 


1. ALBUTEROL NEBULIZER


 2. I.V SOLUMEDROL


 3. OXYGEN BY NASAL CANNULA


 4. ROBITUSSIN FOR COUGH








(2) BOOGIE (acute kidney injury)


Current Visit: Yes   Status: Acute   


Plan to address problem: 


1. NEPHROLOGY CONSULT


 2. I.V FLUID

## 2020-07-31 RX ADMIN — ZOLPIDEM TARTRATE SCH MG: 5 TABLET ORAL at 22:08

## 2020-07-31 RX ADMIN — ALBUTEROL SULFATE SCH MG: 2.5 SOLUTION RESPIRATORY (INHALATION) at 09:56

## 2020-07-31 RX ADMIN — METHYLPREDNISOLONE SODIUM SUCCINATE SCH MG: 125 INJECTION, POWDER, FOR SOLUTION INTRAMUSCULAR; INTRAVENOUS at 13:02

## 2020-07-31 RX ADMIN — LISINOPRIL SCH MG: 10 TABLET ORAL at 10:01

## 2020-07-31 RX ADMIN — SERTRALINE SCH MG: 25 TABLET, FILM COATED ORAL at 09:59

## 2020-07-31 RX ADMIN — HEPARIN SODIUM SCH UNIT: 5000 INJECTION, SOLUTION INTRAVENOUS; SUBCUTANEOUS at 09:59

## 2020-07-31 RX ADMIN — ALBUTEROL SULFATE SCH: 2.5 SOLUTION RESPIRATORY (INHALATION) at 06:16

## 2020-07-31 RX ADMIN — ASPIRIN SCH MG: 81 TABLET, COATED ORAL at 09:59

## 2020-07-31 RX ADMIN — METHYLPREDNISOLONE SODIUM SUCCINATE SCH MG: 125 INJECTION, POWDER, FOR SOLUTION INTRAMUSCULAR; INTRAVENOUS at 05:12

## 2020-07-31 RX ADMIN — METHYLPREDNISOLONE SODIUM SUCCINATE SCH MG: 40 INJECTION, POWDER, FOR SOLUTION INTRAMUSCULAR; INTRAVENOUS at 22:08

## 2020-07-31 RX ADMIN — ALBUTEROL SULFATE SCH MG: 2.5 SOLUTION RESPIRATORY (INHALATION) at 14:56

## 2020-07-31 RX ADMIN — HEPARIN SODIUM SCH UNIT: 5000 INJECTION, SOLUTION INTRAVENOUS; SUBCUTANEOUS at 22:08

## 2020-07-31 RX ADMIN — ALBUTEROL SULFATE SCH MG: 2.5 SOLUTION RESPIRATORY (INHALATION) at 20:26

## 2020-07-31 NOTE — PROGRESS NOTE
Assessment and Plan


Assessment and plan: 


--Reactive airway disease/unspecified asthma


Oxygen titrate O2 sats to more than 90%, nebulizers


IV antibiotics, cough medicine, supportive care





--Acute hypoxic respiratory failure present on admission;


EMS reports that patient's room air O2 sats were 75%


Which improved to 96% on 3 L nasal cannula oxygen IV magnesium and Solu-Medrol


Evaluate for home oxygen





--History of coronary artery disease;


Continue current management





--Hypertension; moderate control, 


continue current antihypertensives, PRN medications





--Dyslipidemia; resume home statin Lipitor 40 mg nightly





--Obesity; counseling advised diet modification


 exercise as tolerated and weight reduction, when medically stable





--DVT prophylaxis; heparin





Monitor closely and adjust management as needed


Home oxygen evaluation, possible discharge home tomorrow with home health





Plan of care reviewed with the patient, her nurse and the case management











History


Interval history: 


Patient seen and examined this morning at the bedside


Patient is in mild distress, with severe chest congestion and wheezing


Patient has intermittent hypoxia on nasal cannula oxygen


Vital signs reviewed








Hospitalist Physical





- Constitutional


Vitals: 


                                        











Temp Pulse Resp BP Pulse Ox


 


 98.0 F   68   20   97/49   95 


 


 07/31/20 15:27  07/31/20 15:27  07/31/20 15:27  07/31/20 15:27  07/31/20 15:27











General appearance: Present: mild distress, well-nourished, obese





- EENT


Eyes: Present: PERRL, EOM intact





- Neck


Neck: Present: supple, normal ROM





- Respiratory


Respiratory effort: normal


Respiratory: bilateral: diminished, wheezing, negative: rales, rhonchi





- Cardiovascular


Rhythm: regular


Heart Sounds: Present: S1 & S2





- Extremities


Extremities: no ischemia, No edema





- Abdominal


General gastrointestinal: soft, non-tender, non-distended, normal bowel sounds





- Integumentary


Integumentary: Present: clear, warm





- Psychiatric


Psychiatric: appropriate mood/affect, cooperative





- Neurologic


Neurologic: moves all extremities





HEART Score





- HEART Score


Age: > 65


Risk factors: 1-2 risk factors


Troponin: 


                                        











Troponin T  < 0.010 ng/mL (0.00-0.029)   07/29/20  22:22    











Troponin: < normal limit





- Critical Actions


Critical Actions: 0-3 pts:0.9-1.7%risk of adverse cardiac event.Candidate for 

discharge





Results





- Labs


CBC & Chem 7: 


                                 07/29/20 22:22





                                 07/29/20 22:22


Labs: 


                             Laboratory Last Values











WBC  8.8 K/mm3 (4.5-11.0)   07/29/20  22:22    


 


RBC  4.39 M/mm3 (3.65-5.03)   07/29/20  22:22    


 


Hgb  14.3 gm/dl (10.1-14.3)   07/29/20  22:22    


 


Hct  41.7 % (30.3-42.9)   07/29/20  22:22    


 


MCV  95 fl (79-97)   07/29/20  22:22    


 


MCH  33 pg (28-32)  H  07/29/20  22:22    


 


MCHC  34 % (30-34)   07/29/20  22:22    


 


RDW  13.6 % (13.2-15.2)   07/29/20  22:22    


 


Plt Count  159 K/mm3 (140-440)   07/29/20  22:22    


 


Lymph % (Auto)  9.6 % (13.4-35.0)  L  07/29/20  22:22    


 


Mono % (Auto)  7.6 % (0.0-7.3)  H  07/29/20  22:22    


 


Eos % (Auto)  1.4 % (0.0-4.3)   07/29/20  22:22    


 


Baso % (Auto)  0.5 % (0.0-1.8)   07/29/20  22:22    


 


Lymph #  0.8 K/mm3 (1.2-5.4)  L  07/29/20  22:22    


 


Mono #  0.7 K/mm3 (0.0-0.8)   07/29/20  22:22    


 


Eos #  0.1 K/mm3 (0.0-0.4)   07/29/20  22:22    


 


Baso #  0.0 K/mm3 (0.0-0.1)   07/29/20  22:22    


 


Seg Neutrophils %  80.9 % (40.0-70.0)  H  07/29/20  22:22    


 


Seg Neutrophils #  7.1 K/mm3 (1.8-7.7)   07/29/20  22:22    


 


D-Dimer  169.50 ng/mlDDU (0-234)   07/29/20  22:22    


 


Sodium  139 mmol/L (137-145)   07/29/20  22:22    


 


Potassium  5.0 mmol/L (3.6-5.0)   07/29/20  22:22    


 


Chloride  96.1 mmol/L ()  L  07/29/20  22:22    


 


Carbon Dioxide  36 mmol/L (22-30)  H  07/29/20  22:22    


 


Anion Gap  12 mmol/L  07/29/20  22:22    


 


BUN  11 mg/dL (7-17)   07/29/20  22:22    


 


Creatinine  1.4 mg/dL (0.7-1.2)  H  07/29/20  22:22    


 


Estimated GFR  37 ml/min  07/29/20  22:22    


 


BUN/Creatinine Ratio  8 %  07/29/20  22:22    


 


Glucose  143 mg/dL ()  H  07/29/20  22:22    


 


Calcium  9.3 mg/dL (8.4-10.2)   07/29/20  22:22    


 


Total Bilirubin  0.30 mg/dL (0.1-1.2)   07/29/20  22:22    


 


AST  15 units/L (5-40)   07/29/20  22:22    


 


ALT  15 units/L (7-56)   07/29/20  22:22    


 


Alkaline Phosphatase  98 units/L ()   07/29/20  22:22    


 


Total Creatine Kinase  57 units/L ()   07/29/20  22:22    


 


CK-MB (CK-2)  2.3 ng/mL (0.0-4.0)   07/29/20  22:22    


 


CK-MB (CK-2) Rel Index  4.0  (0-4)   07/29/20  22:22    


 


Troponin T  < 0.010 ng/mL (0.00-0.029)   07/29/20  22:22    


 


Total Protein  6.8 g/dL (6.3-8.2)   07/29/20  22:22    


 


Albumin  3.9 g/dL (3.9-5)   07/29/20  22:22    


 


Albumin/Globulin Ratio  1.3 %  07/29/20  22:22    











Loza/IV: 


                                        





Voiding Method                   Toilet


IV Catheter Type [Left           Peripheral IV


Antecubital]                     











Active Medications





- Current Medications


Current Medications: 














Generic Name Dose Route Start Last Admin





  Trade Name Freq  PRN Reason Stop Dose Admin


 


Albuterol  2.5 mg  07/30/20 08:00  07/31/20 14:56





  Proventil  IH   2.5 mg





  Q6HRT BRANDY   Administration


 


Aspirin  81 mg  07/30/20 10:00  07/31/20 09:59





  Halfprin Ec  PO   81 mg





  DAILY BRANDY   Administration


 


Atorvastatin Calcium  5 mg  07/30/20 22:00  07/30/20 21:43





  Atorvastatin  PO   5 mg





  QHS BRNADY   Administration


 


Carvedilol  12.5 mg  07/30/20 10:00  07/31/20 10:01





  Coreg  PO   12.5 mg





  BID BRANDY   Administration


 


Guaifenesin  200 mg  07/30/20 06:45 





  Robitussin  PO  





  Q4H PRN  





  Cough  


 


Heparin Sodium (Porcine)  5,000 unit  07/30/20 10:00  07/31/20 09:59





  Heparin  SUB-Q   5,000 unit





  Q12HR BRANDY   Administration


 


Hydrochlorothiazide  12.5 mg  07/30/20 10:00  07/31/20 09:59





  Hctz  PO   12.5 mg





  QDAY BRANDY   Administration


 


Lisinopril  10 mg  07/30/20 10:00  07/31/20 10:01





  Zestril  PO   10 mg





  QDAY [facility]   Administration


 


Meclizine HCl  25 mg  07/30/20 06:45 





  Antivert  PO  





  TID PRN  





  Vertigo  


 


Methylprednisolone Sodium Succinate  60 mg  07/30/20 08:00  07/31/20 13:02





  Solu-Medrol  IV   60 mg





  Q8HR BRANDY   Administration


 


Sertraline HCl  50 mg  07/30/20 10:00  07/31/20 09:59





  Zoloft  PO   50 mg





  DAILY BRANDY   Administration


 


Zolpidem Tartrate  10 mg  07/30/20 22:00  07/30/20 21:43





  Ambien  PO   10 mg





  QHS BRANDY   Administration

## 2020-08-01 VITALS — SYSTOLIC BLOOD PRESSURE: 111 MMHG | DIASTOLIC BLOOD PRESSURE: 69 MMHG

## 2020-08-01 LAB
BUN SERPL-MCNC: 27 MG/DL (ref 7–17)
BUN/CREAT SERPL: 54 %
CALCIUM SERPL-MCNC: 9 MG/DL (ref 8.4–10.2)
HEMOLYSIS INDEX: 11

## 2020-08-01 RX ADMIN — ALBUTEROL SULFATE SCH MG: 2.5 SOLUTION RESPIRATORY (INHALATION) at 14:10

## 2020-08-01 RX ADMIN — HEPARIN SODIUM SCH UNIT: 5000 INJECTION, SOLUTION INTRAVENOUS; SUBCUTANEOUS at 09:30

## 2020-08-01 RX ADMIN — SERTRALINE SCH MG: 25 TABLET, FILM COATED ORAL at 09:29

## 2020-08-01 RX ADMIN — METHYLPREDNISOLONE SODIUM SUCCINATE SCH MG: 40 INJECTION, POWDER, FOR SOLUTION INTRAMUSCULAR; INTRAVENOUS at 05:16

## 2020-08-01 RX ADMIN — METHYLPREDNISOLONE SODIUM SUCCINATE SCH MG: 40 INJECTION, POWDER, FOR SOLUTION INTRAMUSCULAR; INTRAVENOUS at 12:29

## 2020-08-01 RX ADMIN — ALBUTEROL SULFATE SCH MG: 2.5 SOLUTION RESPIRATORY (INHALATION) at 02:34

## 2020-08-01 RX ADMIN — LISINOPRIL SCH: 10 TABLET ORAL at 09:30

## 2020-08-01 RX ADMIN — ALBUTEROL SULFATE SCH MG: 2.5 SOLUTION RESPIRATORY (INHALATION) at 10:23

## 2020-08-01 RX ADMIN — ASPIRIN SCH MG: 81 TABLET, COATED ORAL at 09:29

## 2020-08-01 NOTE — DISCHARGE SUMMARY
Providers





- Providers


Date of Admission: 


07/30/20 00:45





Date of discharge: 08/01/20


Attending physician: 


AMY J KOCHERLA





Primary care physician: 


PRIMARY CARE MD








Hospitalization


Condition: Critical


Disposition: DC/TX-06 HOME UNDER HOME HLTH


Time spent for discharge: 35 min





Core Measure Documentation





- Palliative Care


Palliative Care/ Comfort Measures: Not Applicable





- Core Measures


Any of the following diagnoses?: none





Exam





- Constitutional


Vitals: 


                                        











Temp Pulse Resp BP Pulse Ox


 


 98.0 F   92 H  15   111/69   94 


 


 08/01/20 07:27  08/01/20 14:11  08/01/20 14:11  08/01/20 07:27  08/01/20 10:25











General appearance: Present: no acute distress, well-nourished





- EENT


Eyes: Present: PERRL, EOM intact





- Neck


Neck: Present: supple, normal ROM





- Respiratory


Respiratory effort: normal


Respiratory: bilateral: diminished, negative: rales, rhonchi, wheezing





- Cardiovascular


Rhythm: regular


Heart Sounds: Present: S1 & S2





- Extremities


Extremities: no ischemia, No edema





Plan


Activity: advance as tolerated, fall precautions


Diet: other (cardiac diet)


Additional Instructions: Advised 2 L of oxygen nasal cannula as needed.  If you 

have worsening symptoms contact MD or go to emergency room.  Advised to see your

private pulmonologist in 1 week


Follow up with: 


PRIMARY CARE,MD [Primary Care Provider] - 3-5 Days


Prescriptions: 


Prednisone [predniSONE 10 mg (6-Day Pack, 21 Tabs)] 10 mg PO .TAPER #1 tab.ds.pk


Benzonatate [Tessalon Perles] 100 mg PO Q8HR #30 capsule


Azithromycin [Zithromax Z-JAYME] 0 mg PO DAILY #1 tab

## 2022-06-18 ENCOUNTER — HOSPITAL ENCOUNTER (INPATIENT)
Dept: HOSPITAL 5 - ED | Age: 77
LOS: 1 days | Discharge: HOME | DRG: 189 | End: 2022-06-19
Attending: STUDENT IN AN ORGANIZED HEALTH CARE EDUCATION/TRAINING PROGRAM | Admitting: HOSPITALIST
Payer: MEDICARE

## 2022-06-18 DIAGNOSIS — I50.22: ICD-10-CM

## 2022-06-18 DIAGNOSIS — F41.9: ICD-10-CM

## 2022-06-18 DIAGNOSIS — Z71.3: ICD-10-CM

## 2022-06-18 DIAGNOSIS — Z79.82: ICD-10-CM

## 2022-06-18 DIAGNOSIS — E66.9: ICD-10-CM

## 2022-06-18 DIAGNOSIS — Z79.899: ICD-10-CM

## 2022-06-18 DIAGNOSIS — J44.1: ICD-10-CM

## 2022-06-18 DIAGNOSIS — F32.9: ICD-10-CM

## 2022-06-18 DIAGNOSIS — K21.9: ICD-10-CM

## 2022-06-18 DIAGNOSIS — I11.0: ICD-10-CM

## 2022-06-18 DIAGNOSIS — Z98.51: ICD-10-CM

## 2022-06-18 DIAGNOSIS — I25.10: ICD-10-CM

## 2022-06-18 DIAGNOSIS — E78.00: ICD-10-CM

## 2022-06-18 DIAGNOSIS — J96.21: Primary | ICD-10-CM

## 2022-06-18 DIAGNOSIS — Z82.49: ICD-10-CM

## 2022-06-18 DIAGNOSIS — M19.90: ICD-10-CM

## 2022-06-18 LAB
BAND NEUTROPHILS # (MANUAL): 0 K/MM3
BUN SERPL-MCNC: 22 MG/DL (ref 7–17)
BUN/CREAT SERPL: 28 %
CALCIUM SERPL-MCNC: 8.8 MG/DL (ref 8.4–10.2)
HCO3 BLDA-SCNC: 43 MMOL/L (ref 20–26)
HCT VFR BLD CALC: 30.5 % (ref 30.3–42.9)
HEMOLYSIS INDEX: 7
HGB BLD-MCNC: 9.6 GM/DL (ref 10.1–14.3)
MCHC RBC AUTO-ENTMCNC: 31 % (ref 30–34)
MCV RBC AUTO: 87 FL (ref 79–97)
MYELOCYTES # (MANUAL): 0 K/MM3
OVALOCYTES BLD QL SMEAR: (no result)
PCO2 BLDA: 83.4 MM HG
PH BLDA: 7.33 PH UNITS (ref 7.35–7.45)
PLATELET # BLD: 185 K/MM3 (ref 140–440)
PO2 BLDA: 106.7 MM HG (ref 80–90)
PROMYELOCYTES # (MANUAL): 0 K/MM3
RBC # BLD AUTO: 3.5 M/MM3 (ref 3.65–5.03)
TOTAL CELLS COUNTED BLD: 100

## 2022-06-18 PROCEDURE — 94660 CPAP INITIATION&MGMT: CPT

## 2022-06-18 PROCEDURE — 84484 ASSAY OF TROPONIN QUANT: CPT

## 2022-06-18 PROCEDURE — 4A033R1 MEASUREMENT OF ARTERIAL SATURATION, PERIPHERAL, PERCUTANEOUS APPROACH: ICD-10-PCS | Performed by: HOSPITALIST

## 2022-06-18 PROCEDURE — 94640 AIRWAY INHALATION TREATMENT: CPT

## 2022-06-18 PROCEDURE — 85007 BL SMEAR W/DIFF WBC COUNT: CPT

## 2022-06-18 PROCEDURE — 5A09357 ASSISTANCE WITH RESPIRATORY VENTILATION, LESS THAN 24 CONSECUTIVE HOURS, CONTINUOUS POSITIVE AIRWAY PRESSURE: ICD-10-PCS | Performed by: HOSPITALIST

## 2022-06-18 PROCEDURE — 85025 COMPLETE CBC W/AUTO DIFF WBC: CPT

## 2022-06-18 PROCEDURE — 82803 BLOOD GASES ANY COMBINATION: CPT

## 2022-06-18 PROCEDURE — 71045 X-RAY EXAM CHEST 1 VIEW: CPT

## 2022-06-18 PROCEDURE — 80048 BASIC METABOLIC PNL TOTAL CA: CPT

## 2022-06-18 PROCEDURE — 36415 COLL VENOUS BLD VENIPUNCTURE: CPT

## 2022-06-18 PROCEDURE — 94760 N-INVAS EAR/PLS OXIMETRY 1: CPT

## 2022-06-18 PROCEDURE — 93005 ELECTROCARDIOGRAM TRACING: CPT

## 2022-06-18 RX ADMIN — BENZONATATE SCH MG: 100 CAPSULE ORAL at 07:09

## 2022-06-18 RX ADMIN — METHYLPREDNISOLONE SODIUM SUCCINATE SCH MG: 40 INJECTION, POWDER, FOR SOLUTION INTRAMUSCULAR; INTRAVENOUS at 13:27

## 2022-06-18 RX ADMIN — METHYLPREDNISOLONE SODIUM SUCCINATE SCH MG: 40 INJECTION, POWDER, FOR SOLUTION INTRAMUSCULAR; INTRAVENOUS at 07:09

## 2022-06-18 RX ADMIN — METHYLPREDNISOLONE SODIUM SUCCINATE SCH MG: 40 INJECTION, POWDER, FOR SOLUTION INTRAMUSCULAR; INTRAVENOUS at 18:17

## 2022-06-18 RX ADMIN — IPRATROPIUM BROMIDE AND ALBUTEROL SULFATE SCH AMPUL: .5; 3 SOLUTION RESPIRATORY (INHALATION) at 21:35

## 2022-06-18 RX ADMIN — BENZONATATE SCH MG: 100 CAPSULE ORAL at 13:27

## 2022-06-18 RX ADMIN — Medication SCH ML: at 21:25

## 2022-06-18 RX ADMIN — FAMOTIDINE SCH MG: 20 TABLET ORAL at 21:25

## 2022-06-18 RX ADMIN — BENZONATATE SCH: 100 CAPSULE ORAL at 21:25

## 2022-06-18 RX ADMIN — Medication SCH ML: at 12:39

## 2022-06-18 RX ADMIN — HEPARIN SODIUM SCH UNIT: 5000 INJECTION, SOLUTION INTRAVENOUS; SUBCUTANEOUS at 12:39

## 2022-06-18 RX ADMIN — ASPIRIN SCH MG: 81 TABLET, COATED ORAL at 12:39

## 2022-06-18 RX ADMIN — IPRATROPIUM BROMIDE AND ALBUTEROL SULFATE SCH AMPUL: .5; 3 SOLUTION RESPIRATORY (INHALATION) at 14:53

## 2022-06-18 RX ADMIN — FAMOTIDINE SCH MG: 20 TABLET ORAL at 12:39

## 2022-06-18 RX ADMIN — FUROSEMIDE SCH MG: 10 INJECTION, SOLUTION INTRAVENOUS at 18:17

## 2022-06-18 RX ADMIN — AZITHROMYCIN SCH MG: 250 TABLET, FILM COATED ORAL at 12:40

## 2022-06-18 RX ADMIN — IPRATROPIUM BROMIDE AND ALBUTEROL SULFATE SCH AMPUL: .5; 3 SOLUTION RESPIRATORY (INHALATION) at 08:10

## 2022-06-18 RX ADMIN — HEPARIN SODIUM SCH UNIT: 5000 INJECTION, SOLUTION INTRAVENOUS; SUBCUTANEOUS at 21:25

## 2022-06-18 RX ADMIN — LISINOPRIL SCH: 20 TABLET ORAL at 12:40

## 2022-06-18 NOTE — PROGRESS NOTE
Assessment and Plan


Assessment and plan: 





History of present illness: 





77-year-old female with history of CHF, COPD brought in by EMS from home with 

complaint of shortness of breath.  She is on home oxygen at 3 L and states that 

she began experiencing difficulty breathing this evening.  States she got up to 

go to the restroom and fell to the floor.  EMS arrived and placed patient on 

nonrebreather at 100%.  Patient reports that she recently saw her cardiologist 

who told her she would possibly need a thoracentesis.  Patient reports history 

of multiple thoracenteses in the past.





   WBC count is normal.  Patient is afebrile.  She was placed on BiPAP.  





***Avoid PT in this patient. Daughter explicitly stated patient should not be 

moving with therapist due to instructions to avoid strenuous activity from her 

OP cardiologist***





Hospital Course:


6/18: Clinically appears improved. Lung exam improved, but has significant 

pitting edema. Daughter states patient has gained 40 lbs in 6 weeks.  Will 

diurese with Lasix IV twice daily.  Plan for discharge home tomorrow if 

continues to show improvement.





Assessment and Plan:


#Acute on chronic respiratory failure with hypoxia


 - hx of copd on 3l/min O2 at home, "endstage" CHF


 ABG obtained with pH 7.33, PCO2 83, PO2 103, HCO3 43.


 - Chest x-ray shows opacities in the right middle lobe, possibly atelectasis or

infiltrate.


- etiology of admission likely multifactorial, likely copd exacerbation in the 

setting of some volume overload


- treatment with duonebs, budesonide, Solumedrol IV, and azithromyin


- Lasix 40 mg IV bid 





#Acute exacerbation of COPD with asthma


- management as above





#Chronic Systolic Heart Failure


- noted, doubt in acute exacerbation, 


- resume home coreg, lisinopril,


- diuresis with iv lasix, transition to oral tomorrow


- strict I/O's





#Obstructive sleep apnea


-bipap qhs








#Lower Extremity Edema


- lasix as above.


- has gained 40 lbs in last 6 weeks.





#CAD


- noted, resume home aspirin ,atorvastatin





#Hyperlipidemia


- atorvastatin as above





#Hypertension


- resume home meds as above: amlodipine, coreg, lisinopril, lasix.





#Morbid Obesity


- BMI : 41.6


- Counseled patient on the importance of weight loss, incorporating exercise, 

and dietary changes (lean meats, fresh fruits and vegetables, and water intake).

Patient expresses understanding. 


- Time: +15 min 





#Advance care planning


Disease education conducted, care plan discussed, diagnoses discussed, prognosis

discussed, patient is full code, patient acknowledges understanding and agree 

with care plan, discussed about patient clinical course and answered all 

questions to satisfaction. +30 minutes.


 +30 minutes.





The high probability of a clinically significant, sudden or life threatening 

deterioration of the [multi] system(s) required my full and direct attention, 

intervention and personal management. The aggregate critical care time was [60] 

minutes. This time is in addition to time spent performing reported procedures 

but includes the following: 





[x] Data Review and interpretation 





[x] Patient assessment and monitoring of vital signs 





[x] Documentation 





[x] Medication orders and management








History


Interval history: 





resting comfortably on encounter. Transitioned from bipap to 3l/min nc. 





Hospitalist Physical





- Physical exam


Narrative exam: 





Physical Exam:


VITAL SIGNS:  Reviewed.    


GENERAL:  The patient appears normally developed, Vital signs as documented. on 

3l/min nc. obese


HEAD:  No signs of head trauma.


EYES:  Pupils are equal.  Extraocular motions intact.  


EARS:  Hearing grossly intact.


MOUTH:  Oropharynx is normal. 


NECK:  No adenopathy, no JVD.  


CHEST:  poor air movement.


CARDIAC:  Regular rate and rhythm.  S1 and S2, without murmurs, gallops, or 

rubs.


VASCULAR:   Peripheral pulses normal and equal in all extremities.


ABDOMEN:  Soft, non tender and non distended.  No   rebound or guarding, and no 

masses palpated.   Bowel Sounds normal.


MUSCULOSKELETAL:  Good range of motion of all major joints. Extremities without 

clubbing, cyanosis. significant pitting edema.


NEUROLOGIC EXAM:  Alert and oriented x 4. no focal sensory or strength deficits.

  


PSYCHIATRIC:  Mood normal.


SKIN:  detail exam as documented in skin assessment





- Constitutional


Vitals: 


                                        











Temp Pulse Resp BP Pulse Ox


 


 98 F   72   16   133/82   98 


 


 06/18/22 01:40  06/18/22 08:15  06/18/22 08:15  06/18/22 08:15  06/18/22 08:15











General appearance: Present: no acute distress, well-nourished





HEART Score





- HEART Score


Troponin: 


                                        











Troponin T  < 0.010 ng/mL (0.00-0.029)   06/18/22  01:58    














Results





- Labs


CBC & Chem 7: 


                                 06/18/22 01:58





                                 06/18/22 01:58


Labs: 


                             Laboratory Last Values











WBC  8.9 K/mm3 (4.5-11.0)   06/18/22  01:58    


 


RBC  3.50 M/mm3 (3.65-5.03)  L  06/18/22  01:58    


 


Hgb  9.6 gm/dl (10.1-14.3)  L  06/18/22  01:58    


 


Hct  30.5 % (30.3-42.9)   06/18/22  01:58    


 


MCV  87 fl (79-97)   06/18/22  01:58    


 


MCH  27 pg (28-32)  L  06/18/22  01:58    


 


MCHC  31 % (30-34)   06/18/22  01:58    


 


RDW  16.4 % (13.2-15.2)  H  06/18/22  01:58    


 


Plt Count  185 K/mm3 (140-440)   06/18/22  01:58    


 


Mono % (Auto)  Np   06/18/22  01:58    


 


Add Manual Diff  Complete   06/18/22  01:58    


 


Total Counted  100   06/18/22  01:58    


 


Seg Neuts % (Manual)  88.0 % (40.0-70.0)  H  06/18/22  01:58    


 


Band Neutrophils %  0 %  06/18/22  01:58    


 


Lymphocytes % (Manual)  1.0 % (13.4-35.0)  L  06/18/22  01:58    


 


Reactive Lymphs % (Man)  0 %  06/18/22  01:58    


 


Monocytes % (Manual)  11.0 % (0.0-7.3)  H  06/18/22  01:58    


 


Eosinophils % (Manual)  0 % (0.0-4.3)   06/18/22  01:58    


 


Basophils % (Manual)  0 % (0.0-1.8)   06/18/22  01:58    


 


Metamyelocytes %  0 %  06/18/22  01:58    


 


Myelocytes %  0 %  06/18/22  01:58    


 


Promyelocytes %  0 %  06/18/22  01:58    


 


Blast Cells %  0 %  06/18/22  01:58    


 


Nucleated RBC %  Not Reportable   06/18/22  01:58    


 


Seg Neutrophils # Man  7.8 K/mm3 (1.8-7.7)  H  06/18/22  01:58    


 


Band Neutrophils #  0.0 K/mm3  06/18/22  01:58    


 


Lymphocytes # (Manual)  0.1 K/mm3 (1.2-5.4)  L  06/18/22  01:58    


 


Abs React Lymphs (Man)  0.0 K/mm3  06/18/22  01:58    


 


Monocytes # (Manual)  1.0 K/mm3 (0.0-0.8)  H  06/18/22  01:58    


 


Eosinophils # (Manual)  0.0 K/mm3 (0.0-0.4)   06/18/22  01:58    


 


Basophils # (Manual)  0.0 K/mm3 (0.0-0.1)   06/18/22  01:58    


 


Metamyelocytes #  0.0 K/mm3  06/18/22  01:58    


 


Myelocytes #  0.0 K/mm3  06/18/22  01:58    


 


Promyelocytes #  0.0 K/mm3  06/18/22  01:58    


 


Blast Cells #  0.0 K/mm3  06/18/22  01:58    


 


WBC Morphology  Not Reportable   06/18/22  01:58    


 


Hypersegmented Neuts  Not Reportable   06/18/22  01:58    


 


Hyposegmented Neuts  Not Reportable   06/18/22  01:58    


 


Hypogranular Neuts  Not Reportable   06/18/22  01:58    


 


Smudge Cells  Not Reportable   06/18/22  01:58    


 


Toxic Granulation  Not Reportable   06/18/22  01:58    


 


Toxic Vacuolation  Not Reportable   06/18/22  01:58    


 


Dohle Bodies  Not Reportable   06/18/22  01:58    


 


Pelger-Huet Anomaly  Not Reportable   06/18/22  01:58    


 


Hugh Rods  Not Reportable   06/18/22  01:58    


 


Platelet Estimate  Consistent w auto   06/18/22  01:58    


 


Clumped Platelets  Not Reportable   06/18/22  01:58    


 


Plt Clumps, EDTA  Not Reportable   06/18/22  01:58    


 


Large Platelets  Not Reportable   06/18/22  01:58    


 


Giant Platelets  Not Reportable   06/18/22  01:58    


 


Platelet Satelliting  Not Reportable   06/18/22  01:58    


 


Plt Morphology Comment  Not Reportable   06/18/22  01:58    


 


RBC Morphology  Not Reportable   06/18/22  01:58    


 


Dimorphic RBCs  Not Reportable   06/18/22  01:58    


 


Polychromasia  Not Reportable   06/18/22  01:58    


 


Hypochromasia  Not Reportable   06/18/22  01:58    


 


Poikilocytosis  Not Reportable   06/18/22  01:58    


 


Anisocytosis  Not Reportable   06/18/22  01:58    


 


Microcytosis  Not Reportable   06/18/22  01:58    


 


Macrocytosis  Not Reportable   06/18/22  01:58    


 


Spherocytes  Not Reportable   06/18/22  01:58    


 


Pappenheimer Bodies  Not Reportable   06/18/22  01:58    


 


Sickle Cells  Not Reportable   06/18/22  01:58    


 


Target Cells  Not Reportable   06/18/22  01:58    


 


Tear Drop Cells  Not Reportable   06/18/22  01:58    


 


Ovalocytes  1+   06/18/22  01:58    


 


Helmet Cells  Not Reportable   06/18/22  01:58    


 


Galdamez-Ballou Bodies  Not Reportable   06/18/22  01:58    


 


Cabot Rings  Not Reportable   06/18/22  01:58    


 


Tea Cells  Not Reportable   06/18/22  01:58    


 


Bite Cells  Not Reportable   06/18/22  01:58    


 


Crenated Cell  Not Reportable   06/18/22  01:58    


 


Elliptocytes  Not Reportable   06/18/22  01:58    


 


Acanthocytes (Spur)  Not Reportable   06/18/22  01:58    


 


Rouleaux  Not Reportable   06/18/22  01:58    


 


Hemoglobin C Crystals  Not Reportable   06/18/22  01:58    


 


Schistocytes  Not Reportable   06/18/22  01:58    


 


Malaria parasites  Not Reportable   06/18/22  01:58    


 


Sami Bodies  Not Reportable   06/18/22  01:58    


 


Hem Pathologist Commnt  No   06/18/22  01:58    


 


ABG pH  7.330 pH Units (7.350-7.450)  L  06/18/22  01:46    


 


ABG pCO2  83.4 mm Hg  06/18/22  01:46    


 


ABG pO2  106.7 mm Hg (80.0-90.0)  H  06/18/22  01:46    


 


ABG HCO3  43.0 mmol/L (20.0-26.0)  H  06/18/22  01:46    


 


ABG O2 Saturation  97.4 % (95.0-99.0)   06/18/22  01:46    


 


ABG O2 Content  13.0  (0.0-44)   06/18/22  01:46    


 


ABG Base Excess  14.5 mmol/L (-2.0-3.0)  H  06/18/22  01:46    


 


ABG Hemoglobin  9.6 gm/dl (12.0-16.0)  L  06/18/22  01:46    


 


ABG Carboxyhemoglobin  1.5 % (0.0-5.0)   06/18/22  01:46    


 


ABG Methemoglobin  0.5 % (0.0-1.5)   06/18/22  01:46    


 


Oxyhemoglobin  95.5 % (95.0-99.0)   06/18/22  01:46    


 


FiO2  32 %  06/18/22  01:46    


 


Sodium  139 mmol/L (137-145)   06/18/22  01:58    


 


Potassium  4.2 mmol/L (3.6-5.0)   06/18/22  01:58    


 


Chloride  91.9 mmol/L ()  L  06/18/22  01:58    


 


Carbon Dioxide  42 mmol/L (22-30)  H*  06/18/22  01:58    


 


Anion Gap  9 mmol/L  06/18/22  01:58    


 


BUN  22 mg/dL (7-17)  H  06/18/22  01:58    


 


Creatinine  0.8 mg/dL (0.6-1.2)   06/18/22  01:58    


 


Estimated GFR  > 60 ml/min  06/18/22  01:58    


 


BUN/Creatinine Ratio  28 %  06/18/22  01:58    


 


Glucose  149 mg/dL ()  H  06/18/22  01:58    


 


Calcium  8.8 mg/dL (8.4-10.2)   06/18/22  01:58    


 


Troponin T  < 0.010 ng/mL (0.00-0.029)   06/18/22  01:58    














Active Medications





- Current Medications


Current Medications: 














Generic Name Dose Route Start Last Admin





  Trade Name Freq  PRN Reason Stop Dose Admin


 


Acetaminophen  650 mg  06/18/22 05:48 





  Acetaminophen 325 Mg Tab  PO  





  Q4H PRN  





  Pain MILD(1-3)/Fever >100.5/HA  


 


Albuterol  2.5 mg  06/18/22 05:48 





  Albuterol 2.5 Mg/3 Ml Nebu  IH  





  Q3HRT PRN  





  Shortness Of Breath  


 


Albuterol/Ipratropium  1 ampul  06/18/22 08:00 





  Ipratropium/Albuterol Sulfate 3 Ml Ampul.Neb  IH  





  Q6HRT [facility]  


 


Amlodipine Besylate  5 mg  06/18/22 10:00 





  Amlodipine 5 Mg Tab  PO  





  DAILY [facility]  


 


Aspirin  81 mg  06/18/22 10:00 





  Aspirin Ec 81 Mg Tab  PO  





  DAILY [facility]  


 


Atorvastatin Calcium  40 mg  06/18/22 22:00 





  Atorvastatin 40 Mg Tab  PO  





  QHS [facility]  


 


Azithromycin  500 mg  06/18/22 10:00 





  Azithromycin 250 Mg Tab  PO  06/22/22 10:01 





  DAILY [facility]  





  Protocol  


 


Benzonatate  100 mg  06/18/22 06:00  06/18/22 07:09





  Benzonatate 100 Mg Cap  PO   100 mg





  Q8HR [facility]   Administration


 


Carvedilol  12.5 mg  06/18/22 10:00 





  Carvedilol 12.5 Mg Tab  PO  





  BID [facility]  


 


Famotidine  20 mg  06/18/22 10:00 





  Famotidine 20 Mg Tab  PO  





  BID [facility]  


 


Heparin Sodium (Porcine)  5,000 unit  06/18/22 10:00 





  Heparin 5,000 Unit/1 Ml Vial  SUB-Q  





  Q12HR [facility]  


 


Hydrochlorothiazide  25 mg  06/18/22 10:00 





  Hydrochlorothiazide 25 Mg Tab  PO  





  DAILY [facility]  


 


Lisinopril  20 mg  06/18/22 10:00 





  Lisinopril 20 Mg Tab  PO  





  QDAY [facility]  


 


Meclizine HCl  25 mg  06/18/22 05:51 





  Meclizine 25 Mg Tab  PO  





  TID PRN  





  Vertigo  


 


Methylprednisolone Sodium Succinate  40 mg  06/18/22 06:00  06/18/22 07:09





  Methylprednisolone Sod Succinate 40 Mg/1 Ml Inj  IV   40 mg





  Q6HR [facility]   Administration


 


Montelukast Sodium  10 mg  06/18/22 22:00 





  Montelukast 10 Mg Tab  PO  





  QHS [facility]  


 


Morphine Sulfate  2 mg  06/18/22 05:48 





  Morphine 2 Mg/1 Ml Inj  IV  





  Q4H PRN  





  Pain, Moderate (4-6)  


 


Morphine Sulfate  4 mg  06/18/22 05:48 





  Morphine 4 Mg/1 Ml Inj  IV  





  Q4H PRN  





  Pain , Severe (7-10)  


 


Ondansetron HCl  4 mg  06/18/22 05:48 





  Ondansetron 4 Mg/2 Ml Inj  IV  





  Q8H PRN  





  Nausea And Vomiting  


 


Sodium Chloride  10 ml  06/18/22 10:00 





  Sodium Chloride 0.9% 10 Ml Flush Syringe  IV  





  BID BRANDY  


 


Sodium Chloride  10 ml  06/18/22 05:48 





  Sodium Chloride 0.9% 10 Ml Flush Syringe  IV  





  PRN PRN  





  LINE FLUSH

## 2022-06-18 NOTE — EMERGENCY DEPARTMENT REPORT
ED Shortness of Breath HPI





- General


Chief Complaint: Dyspnea/Respdistress


Stated Complaint: SOB


Time Seen by Provider: 06/18/22 01:38


Source: patient, EMS


Mode of arrival: Stretcher


Limitations: No Limitations





- History of Present Illness


Initial Comments: 





Patient is a 27-year-old female with history of CHF, COPD brought in by EMS from

home with complaint of shortness of breath.  She is on home oxygen at 3 L and 

states that she began experiencing difficulty breathing this evening.  States 

she got up to go to the restroom and fell to the floor.  EMS arrived and placed 

patient on nonrebreather at 100%.  Patient reports that she recently saw her car

diologist who told her she would possibly need a thoracentesis.  Patient reports

history of multiple thoracenteses in the past.





- Related Data


                                Home Medications











 Medication  Instructions  Recorded  Confirmed  Last Taken


 


Sertraline [Zoloft] 100 mg PO DAILY 08/06/15 07/30/20 08/21/17


 


Zolpidem [Ambien] 10 mg PO QHS 08/06/15 07/30/20 08/21/17


 


carvediloL [Coreg] 12.5 mg PO BID 08/06/15 07/30/20 08/22/17 05:30


 


lisinopriL [Zestril TAB] 20 mg PO DAILY 08/06/15 07/30/20 08/21/17





     b


 


AtorvaSTATin [Lipitor] 40 mg PO QHS 07/30/20 07/30/20 Unknown


 


Ergocalciferol (Vitamin D2) 50 mcg PO 1XW 07/30/20 07/30/20 Unknown





[Vitamin D2]    


 


amLODIPine 5 mg PO DAILY 07/30/20 07/30/20 Unknown


 


hydroCHLOROthiazide 25 mg PO DAILY 07/30/20 07/30/20 Unknown





[Hydrochlorothiazide]    








                                  Previous Rx's











 Medication  Instructions  Recorded  Last Taken  Type


 


Aspirin EC [Halfprin EC] 81 mg PO DAILY  tablet 08/01/20 Unknown Rx


 


Azithromycin [Zithromax Z-JAYME] 0 mg PO DAILY #1 tab 08/01/20 Unknown Rx


 


Benzonatate [Tessalon Perles] 100 mg PO Q8HR #30 capsule 08/01/20 Unknown Rx


 


Meclizine [Antivert] 25 mg PO TID PRN  tablet 08/01/20 Unknown Rx


 


Prednisone [predniSONE 10 mg 10 mg PO .TAPER #1 tab.ds.pk 08/01/20 Unknown Rx





(6-Day Pack, 21 Tabs)]    











                                    Allergies











Allergy/AdvReac Type Severity Reaction Status Date / Time


 


codeine AdvReac  Dizziness Verified 06/28/17 14:21














ED Review of Systems


ROS: 


Stated complaint: SOB


Other details as noted in HPI





Comment: All other systems reviewed and negative


Constitutional: denies: chills, fever


Respiratory: orthopnea, shortness of breath


Cardiovascular: edema.  denies: chest pain


Gastrointestinal: denies: abdominal pain, nausea, diarrhea


Genitourinary: denies: urgency, dysuria, discharge


Musculoskeletal: denies: back pain, joint swelling, arthralgia


Skin: denies: rash, lesions


Neurological: denies: headache, weakness, paresthesias


Psychiatric: denies: anxiety, depression





ED Past Medical Hx





- Past Medical History


Previous Medical History?: Yes


Hx Hypertension: Yes


Hx Heart Attack/AMI: No


Hx Congestive Heart Failure: Yes


Hx GERD: Yes


Hx Renal Disease: No


Hx Arthritis: Yes (knees; had couple of injection)


Hx Psychiatric Treatment: Yes (Anxiety, Depression)


Hx Asthma: No


Hx COPD: No


Hx HIV: No


Additional medical history: high cholesterol





- Surgical History


Past Surgical History?: Yes


Additional Surgical History: TUBAL LIGATION





- Social History


Smoking Status: Never Smoker


Substance Use Type: None





- Medications


Home Medications: 


                                Home Medications











 Medication  Instructions  Recorded  Confirmed  Last Taken  Type


 


Sertraline [Zoloft] 100 mg PO DAILY 08/06/15 07/30/20 08/21/17 History


 


Zolpidem [Ambien] 10 mg PO QHS 08/06/15 07/30/20 08/21/17 History


 


carvediloL [Coreg] 12.5 mg PO BID 08/06/15 07/30/20 08/22/17 05:30 History


 


lisinopriL [Zestril TAB] 20 mg PO DAILY 08/06/15 07/30/20 08/21/17 History





     b 


 


AtorvaSTATin [Lipitor] 40 mg PO QHS 07/30/20 07/30/20 Unknown History


 


Ergocalciferol (Vitamin D2) 50 mcg PO 1XW 07/30/20 07/30/20 Unknown History





[Vitamin D2]     


 


amLODIPine 5 mg PO DAILY 07/30/20 07/30/20 Unknown History


 


hydroCHLOROthiazide 25 mg PO DAILY 07/30/20 07/30/20 Unknown History





[Hydrochlorothiazide]     


 


Aspirin EC [Halfprin EC] 81 mg PO DAILY  tablet 08/01/20  Unknown Rx


 


Azithromycin [Zithromax Z-JAYME] 0 mg PO DAILY #1 tab 08/01/20  Unknown Rx


 


Benzonatate [Tessalon Perles] 100 mg PO Q8HR #30 capsule 08/01/20  Unknown Rx


 


Meclizine [Antivert] 25 mg PO TID PRN  tablet 08/01/20  Unknown Rx


 


Prednisone [predniSONE 10 mg 10 mg PO .TAPER #1 tab.ds.pk 08/01/20  Unknown Rx





(6-Day Pack, 21 Tabs)]     














ED Physical Exam





- General


Limitations: No Limitations


General appearance: alert, in no apparent distress, obese





- Head


Head exam: Present: atraumatic, normocephalic





- Respiratory


Respiratory exam: Present: respiratory distress, decreased breath sounds





- Cardiovascular


Cardiovascular Exam: Present: regular rate, normal rhythm, normal heart sounds





- GI/Abdominal


GI/Abdominal exam: Present: soft.  Absent: distended, tenderness





- Rectal


Rectal exam: Present: deferred





- Extremities Exam


Extremities exam: Present: pedal edema (Pitting edema to both lower legs)





- Neurological Exam


Neurological exam: Present: alert, oriented X3





- Psychiatric


Psychiatric exam: Present: normal affect, normal mood





- Skin


Skin exam: Present: warm, dry, intact, normal color





ED Course


                                   Vital Signs











  06/18/22 06/18/22 06/18/22





  01:40 01:45 01:46


 


Temperature 98 F  


 


Pulse Rate 75  71


 


Respiratory 20 19 19





Rate   


 


Blood Pressure 150/90  127/77


 


O2 Sat by Pulse 99 100 100





Oximetry   














  06/18/22 06/18/22 06/18/22





  02:00 02:16 03:39


 


Temperature   


 


Pulse Rate 74 79 76


 


Respiratory 21 14 22





Rate   


 


Blood Pressure 123/79 128/82 124/86


 


O2 Sat by Pulse 98 94 94





Oximetry   














ED Medical Decision Making





- Lab Data


Result diagrams: 


                                 06/18/22 01:58





                                 06/18/22 01:58





- EKG Data


-: EKG Interpreted by Me (Atrial fibrillation with ventricular rate of 77 bpm.  

Right axis deviation)


EKG shows normal: ST-T waves





- Medical Decision Making





Patient with history of COPD and CHF presenting via EMS with complaint of 

dyspnea.  ABG obtained with pH 7.33, PCO2 83, PO2 103, HCO3 43.  Chest x-ray 

shows opacities in the right middle lobe, possibly atelectasis or infiltrate.  

WBC count is normal.  Patient is afebrile.  She was placed on BiPAP.  Will admit

to hospitalist for likely COPD exacerbation.


Critical Care Time: Yes


Critical care time in (mins) excluding proc time.: 45


Critical care attestation.: 


If time is entered above; I have spent that time in minutes in the direct care 

of this critically ill patient, excluding procedure time.








ED Disposition


Clinical Impression: 


 COPD exacerbation, Hypercapnic respiratory failure





Disposition: 09 ADMITTED AS INPATIENT


Is pt being admited?: Yes


Condition: Stable


Instructions:  Chronic Obstructive Pulmonary Disease (ED)

## 2022-06-18 NOTE — ELECTROCARDIOGRAPH REPORT
[facility]

                                       

Test Date:    2022               Test Time:    01:54:03

Pat Name:     GÓMEZ MONGE             Department:   

Patient ID:   [id number]          Room:         A267 1

Gender:       F                        Technician:   LUIS FELIPE

:          1945               Requested By: OWEN AGUILAR

Order Number: [id number]              Reading MD:   French Georges

                                 Measurements

Intervals                              Axis          

Rate:         77                       P:            

MS:                                    QRS:          96

QRSD:         95                       T:            53

QT:           383                                    

QTc:          434                                    

                           Interpretive Statements

Atrial fibrillation

Right axis deviation

Low voltage, precordial leads

No previous ECG available for comparison

Electronically Signed On 2022 11:30:21 EDT by French Georges

## 2022-06-18 NOTE — XRAY REPORT
CHEST 1 VIEW 



INDICATION / CLINICAL INFORMATION: Dyspnea. 



COMPARISON: Chest x-ray 7/29/2020



FINDINGS:



SUPPORT DEVICES: None.

HEART / MEDIASTINUM: Heart size is stable. 

LUNGS / PLEURA: Interval increased opacities right middle and lower lobe with stranding bilaterally a
nd perihilar region stable in appearance.  

BONES: No significant osseous abnormality.

ADDITIONAL FINDINGS: No significant additional findings.







IMPRESSION:

1. Increasing opacities right middle and lower lobe compatible with atelectasis and/or infection. Disha
st otherwise stable.



Signer Name: Tyler Vega II, MD 

Signed: 6/18/2022 2:22 AM

Workstation Name: [id number]

## 2022-06-18 NOTE — HISTORY AND PHYSICAL REPORT
History of Present Illness


Date of examination: 06/18/22


Date of admission: 


06/18/22


Chief complaint: 





Dyspnea


Respiratory distress


History of present illness: 





27-year-old female with history of CHF, COPD brought in by EMS from home with 

complaint of shortness of breath.  She is on home oxygen at 3 L and states that 

she began experiencing difficulty breathing this evening.  States she got up to 

go to the restroom and fell to the floor.  EMS arrived and placed patient on 

nonrebreather at 100%.  Patient reports that she recently saw her cardiologist 

who told her she would possibly need a thoracentesis.  Patient reports history 

of multiple thoracenteses in the past.





 ABG obtained with pH 7.33, PCO2 83, PO2 103, HCO3 43.  Chest x-ray shows 

opacities in the right middle lobe, possibly atelectasis or infiltrate.  WBC 

count is normal.  Patient is afebrile.  She was placed on BiPAP.  





Past History


Past Medical History: arthritis, COPD, GERD, heart failure, hypertension, 

hyperlipidemia, other (Anxiety depression)


Past Surgical History: Other (Tubal ligation)


Social history: no significant social history


Family history: hypertension





Medications and Allergies


                                    Allergies











Allergy/AdvReac Type Severity Reaction Status Date / Time


 


codeine AdvReac  Dizziness Verified 06/28/17 14:21











                                Home Medications











 Medication  Instructions  Recorded  Confirmed  Last Taken  Type


 


Sertraline [Zoloft] 100 mg PO DAILY 08/06/15 07/30/20 08/21/17 History


 


Zolpidem [Ambien] 10 mg PO QHS 08/06/15 07/30/20 08/21/17 History


 


carvediloL [Coreg] 12.5 mg PO BID 08/06/15 07/30/20 08/22/17 05:30 History


 


lisinopriL [Zestril TAB] 20 mg PO DAILY 08/06/15 07/30/20 08/21/17 History





     b 


 


AtorvaSTATin [Lipitor] 40 mg PO QHS 07/30/20 07/30/20 Unknown History


 


Ergocalciferol (Vitamin D2) 50 mcg PO 1XW 07/30/20 07/30/20 Unknown History





[Vitamin D2]     


 


amLODIPine 5 mg PO DAILY 07/30/20 07/30/20 Unknown History


 


hydroCHLOROthiazide 25 mg PO DAILY 07/30/20 07/30/20 Unknown History





[Hydrochlorothiazide]     


 


Aspirin EC [Halfprin EC] 81 mg PO DAILY  tablet 08/01/20  Unknown Rx


 


Azithromycin [Zithromax Z-JAYME] 0 mg PO DAILY #1 tab 08/01/20  Unknown Rx


 


Benzonatate [Tessalon Perles] 100 mg PO Q8HR #30 capsule 08/01/20  Unknown Rx


 


Meclizine [Antivert] 25 mg PO TID PRN  tablet 08/01/20  Unknown Rx


 


Prednisone [predniSONE 10 mg 10 mg PO .TAPER #1 tab.ds.pk 08/01/20  Unknown Rx





(6-Day Pack, 21 Tabs)]     











Active Meds: 


Active Medications





Acetaminophen (Acetaminophen 325 Mg Tab)  650 mg PO Q4H PRN


   PRN Reason: Pain MILD(1-3)/Fever >100.5/HA


Albuterol (Albuterol 2.5 Mg/3 Ml Nebu)  2.5 mg IH Q3HRT PRN


   PRN Reason: Shortness Of Breath


Albuterol/Ipratropium (Ipratropium/Albuterol Sulfate 3 Ml Ampul.Neb)  1 ampul IH

Q6HRT BRANDY


Amlodipine Besylate (Amlodipine 5 Mg Tab)  5 mg PO DAILY BRANDY


Aspirin (Aspirin Ec 81 Mg Tab)  81 mg PO DAILY BRANDY


Atorvastatin Calcium (Atorvastatin 40 Mg Tab)  40 mg PO QHS BRANDY


Azithromycin (Azithromycin 250 Mg Tab)  250 mg PO DAILY BRANDY; Protocol


Benzonatate (Benzonatate 100 Mg Cap)  100 mg PO Q8HR BRANDY


Carvedilol (Carvedilol 3.125 Mg Tab)  12.5 mg PO BID BRANDY


Famotidine (Famotidine 20 Mg Tab)  20 mg PO BID BRANDY


Furosemide (Furosemide 40 Mg/4 Ml Inj)  40 mg IV ONCE ONE


   Stop: 06/18/22 05:54


Heparin Sodium (Porcine) (Heparin 5,000 Unit/1 Ml Vial)  5,000 unit SUB-Q Q12HR 

BRANDY


Hydrochlorothiazide (Hydrochlorothiazide 12.5 Mg Cap)  25 mg PO DAILY BRANDY


Lisinopril (Lisinopril 5 Mg Tab)  20 mg PO DAILY BRANDY


Meclizine HCl (Meclizine 25 Mg Tab)  25 mg PO TID PRN


   PRN Reason: Vertigo


Methylprednisolone Sodium Succinate (Methylprednisolone Sod Succinate 40 Mg/1 Ml

Inj)  40 mg IV Q6HR BRANDY


Montelukast Sodium (Montelukast 10 Mg Tab)  10 mg PO QHS BRANDY


Morphine Sulfate (Morphine 2 Mg/1 Ml Inj)  2 mg IV Q4H PRN


   PRN Reason: Pain, Moderate (4-6)


Morphine Sulfate (Morphine 4 Mg/1 Ml Inj)  4 mg IV Q4H PRN


   PRN Reason: Pain , Severe (7-10)


Ondansetron HCl (Ondansetron 4 Mg/2 Ml Inj)  4 mg IV Q8H PRN


   PRN Reason: Nausea And Vomiting


Sodium Chloride (Sodium Chloride 0.9% 10 Ml Flush Syringe)  10 ml IV BID BRANDY


Sodium Chloride (Sodium Chloride 0.9% 10 Ml Flush Syringe)  10 ml IV PRN PRN


   PRN Reason: LINE FLUSH











Review of Systems


All systems: negative


Cardiovascular: orthopnea, edema, shortness of breath, dyspnea on exertion


Respiratory: shortness of breath, dyspnea on exertion





Exam





- Constitutional


Vitals: 


                                        











Temp Pulse Resp BP Pulse Ox


 


 98 F   76   22   124/86   94 


 


 06/18/22 01:40  06/18/22 03:39  06/18/22 03:39  06/18/22 03:39  06/18/22 03:39











General appearance: Present: no acute distress, well-nourished





- EENT


Eyes: Present: PERRL


ENT: hearing intact, clear oral mucosa





- Neck


Neck: Present: supple, normal ROM





- Respiratory


Respiratory effort: normal


Respiratory: bilateral: diminished, wheezing





- Cardiovascular


Heart Sounds: Present: S1 & S2.  Absent: rub, click





- Extremities


Extremities: pulses symmetrical, No edema


Peripheral Pulses: within normal limits





- Abdominal


General gastrointestinal: Present: soft, non-tender, non-distended, normal bowel

sounds


Female genitourinary: Present: normal





- Integumentary


Integumentary: Present: clear, warm, dry





- Musculoskeletal


Musculoskeletal: gait normal, strength equal bilaterally





- Psychiatric


Psychiatric: appropriate mood/affect, intact judgment & insight





- Neurologic


Neurologic: CNII-XII intact, moves all extremities





HEART Score





- HEART Score


Troponin: 


                                        











Troponin T  < 0.010 ng/mL (0.00-0.029)   06/18/22  01:58    














Results





- Labs


CBC & Chem 7: 


                                 06/18/22 01:58





                                 06/18/22 01:58


Labs: 


                             Laboratory Last Values











WBC  8.9 K/mm3 (4.5-11.0)   06/18/22  01:58    


 


RBC  3.50 M/mm3 (3.65-5.03)  L  06/18/22  01:58    


 


Hgb  9.6 gm/dl (10.1-14.3)  L  06/18/22  01:58    


 


Hct  30.5 % (30.3-42.9)   06/18/22  01:58    


 


MCV  87 fl (79-97)   06/18/22  01:58    


 


MCH  27 pg (28-32)  L  06/18/22  01:58    


 


MCHC  31 % (30-34)   06/18/22  01:58    


 


RDW  16.4 % (13.2-15.2)  H  06/18/22  01:58    


 


Plt Count  185 K/mm3 (140-440)   06/18/22  01:58    


 


Mono % (Auto)  Np   06/18/22  01:58    


 


ABG pH  7.330 pH Units (7.350-7.450)  L  06/18/22  01:46    


 


ABG pCO2  83.4 mm Hg  06/18/22  01:46    


 


ABG pO2  106.7 mm Hg (80.0-90.0)  H  06/18/22  01:46    


 


ABG HCO3  43.0 mmol/L (20.0-26.0)  H  06/18/22  01:46    


 


ABG O2 Saturation  97.4 % (95.0-99.0)   06/18/22  01:46    


 


ABG O2 Content  13.0  (0.0-44)   06/18/22  01:46    


 


ABG Base Excess  14.5 mmol/L (-2.0-3.0)  H  06/18/22  01:46    


 


ABG Hemoglobin  9.6 gm/dl (12.0-16.0)  L  06/18/22  01:46    


 


ABG Carboxyhemoglobin  1.5 % (0.0-5.0)   06/18/22  01:46    


 


ABG Methemoglobin  0.5 % (0.0-1.5)   06/18/22  01:46    


 


Oxyhemoglobin  95.5 % (95.0-99.0)   06/18/22  01:46    


 


FiO2  32 %  06/18/22  01:46    


 


Sodium  139 mmol/L (137-145)   06/18/22  01:58    


 


Potassium  4.2 mmol/L (3.6-5.0)   06/18/22  01:58    


 


Chloride  91.9 mmol/L ()  L  06/18/22  01:58    


 


Carbon Dioxide  42 mmol/L (22-30)  H*  06/18/22  01:58    


 


Anion Gap  9 mmol/L  06/18/22  01:58    


 


BUN  22 mg/dL (7-17)  H  06/18/22  01:58    


 


Creatinine  0.8 mg/dL (0.6-1.2)   06/18/22  01:58    


 


Estimated GFR  > 60 ml/min  06/18/22  01:58    


 


BUN/Creatinine Ratio  28 %  06/18/22  01:58    


 


Glucose  149 mg/dL ()  H  06/18/22  01:58    


 


Calcium  8.8 mg/dL (8.4-10.2)   06/18/22  01:58    


 


Troponin T  < 0.010 ng/mL (0.00-0.029)   06/18/22  01:58    














- Imaging and Cardiology


Chest x-ray: report reviewed





Assessment and Plan


VTE prophylaxis?: Chemical


Plan of care discussed with patient/family: Yes





- Patient Problems


(1) Acute and chronic respiratory failure with hypoxia


Current Visit: Yes   Status: Acute   


Plan to address problem: 


Admit the patient to the [facility] overnight.  Patient is on BiPAP.  DuoNeb by 

nebulizer every 4 hours.  Albuterol via nebulizer every 4 hours as needed Solu-

Medrol 40 mg IV every 6 hours.  Singular 10 mg p.o. daily.  Zithromax to 50 mg 

p.o. daily.  Redo the blood culture sputum culture.  We recheck the ABG CBC BMP 

in the morning








(2) Acute exacerbation of COPD with asthma


Current Visit: Yes   Status: Acute   


Plan to address problem: 


 Patient is on BiPAP.  DuoNeb by nebulizer every 4 hours.  Albuterol via 

nebulizer every 4 hours as needed Solu-Medrol 40 mg IV every 6 hours.  Singular 

10 mg p.o. daily.  Zithromax to 50 mg p.o. daily.  Redo the blood culture sputum

culture.  We recheck the ABG CBC BMP in the morning








(3) CAD (coronary artery disease)


Current Visit: No   Status: Chronic   





(4) Hyperlipidemia


Current Visit: No   Status: Chronic   


Plan to address problem: 


Lipitor 40 mg p.o. daily.  We will recheck the lipid panel








(5) Hypertension


Current Visit: No   Status: Chronic   


Plan to address problem: 


Amlodipine 5 mg p.o. daily.  We will continue the home medication








(6) Obesity


Current Visit: No   Status: Chronic   


Plan to address problem: 


We counseled the patient regarding weight loss.








(7) CHF (congestive heart failure)


Current Visit: Yes   Status: Acute   


Plan to address problem: 


Fluid restriction.  Lasix 40 mg IV x1 dose.  Coreg 12.5 mg p.o. twice daily.  

Evaluate if the patient needs more Lasix.  Outpatient follow-up with cardiology








(8) DVT prophylaxis


Current Visit: Yes   Status: Acute   


Plan to address problem: 


Heparin 5000 units subcu every 12 hours for DVT prophylaxis.  Pepcid 20 mg p.o. 

twice daily for GI prophylaxis.  Patient is a full code

## 2022-06-19 VITALS — SYSTOLIC BLOOD PRESSURE: 74 MMHG | DIASTOLIC BLOOD PRESSURE: 32 MMHG

## 2022-06-19 LAB
BASOPHILS # (AUTO): 0 K/MM3 (ref 0–0.1)
BASOPHILS NFR BLD AUTO: 0.1 % (ref 0–1.8)
BUN SERPL-MCNC: 21 MG/DL (ref 7–17)
BUN/CREAT SERPL: 35 %
CALCIUM SERPL-MCNC: 8.3 MG/DL (ref 8.4–10.2)
EOSINOPHIL # BLD AUTO: 0 K/MM3 (ref 0–0.4)
EOSINOPHIL NFR BLD AUTO: 0 % (ref 0–4.3)
HCT VFR BLD CALC: 29.7 % (ref 30.3–42.9)
HEMOLYSIS INDEX: 2
HGB BLD-MCNC: 9.4 GM/DL (ref 10.1–14.3)
LYMPHOCYTES # BLD AUTO: 0.4 K/MM3 (ref 1.2–5.4)
LYMPHOCYTES NFR BLD AUTO: 6.3 % (ref 13.4–35)
MCHC RBC AUTO-ENTMCNC: 32 % (ref 30–34)
MCV RBC AUTO: 86 FL (ref 79–97)
MONOCYTES # (AUTO): 0.2 K/MM3 (ref 0–0.8)
MONOCYTES % (AUTO): 4.1 % (ref 0–7.3)
PLATELET # BLD: 169 K/MM3 (ref 140–440)
RBC # BLD AUTO: 3.44 M/MM3 (ref 3.65–5.03)

## 2022-06-19 RX ADMIN — ASPIRIN SCH MG: 81 TABLET, COATED ORAL at 10:53

## 2022-06-19 RX ADMIN — FAMOTIDINE SCH MG: 20 TABLET ORAL at 10:54

## 2022-06-19 RX ADMIN — BENZONATATE SCH: 100 CAPSULE ORAL at 06:02

## 2022-06-19 RX ADMIN — Medication SCH ML: at 10:54

## 2022-06-19 RX ADMIN — LISINOPRIL SCH: 20 TABLET ORAL at 10:36

## 2022-06-19 RX ADMIN — HEPARIN SODIUM SCH: 5000 INJECTION, SOLUTION INTRAVENOUS; SUBCUTANEOUS at 10:37

## 2022-06-19 RX ADMIN — METHYLPREDNISOLONE SODIUM SUCCINATE SCH MG: 40 INJECTION, POWDER, FOR SOLUTION INTRAMUSCULAR; INTRAVENOUS at 00:15

## 2022-06-19 RX ADMIN — AZITHROMYCIN SCH MG: 250 TABLET, FILM COATED ORAL at 10:53

## 2022-06-19 RX ADMIN — FUROSEMIDE SCH MG: 10 INJECTION, SOLUTION INTRAVENOUS at 06:25

## 2022-06-19 RX ADMIN — IPRATROPIUM BROMIDE AND ALBUTEROL SULFATE SCH AMPUL: .5; 3 SOLUTION RESPIRATORY (INHALATION) at 01:13

## 2022-06-19 RX ADMIN — METHYLPREDNISOLONE SODIUM SUCCINATE SCH MG: 40 INJECTION, POWDER, FOR SOLUTION INTRAMUSCULAR; INTRAVENOUS at 06:26

## 2022-06-19 RX ADMIN — IPRATROPIUM BROMIDE AND ALBUTEROL SULFATE SCH AMPUL: .5; 3 SOLUTION RESPIRATORY (INHALATION) at 07:25

## 2022-06-19 NOTE — DISCHARGE SUMMARY
Providers





- Providers


Date of Admission: 


06/18/22 05:48





Attending physician: 


OWEN AGUILAR MD





Primary care physician: 


ALVARADO POWELL MD








Hospitalization


Reason for admission: shortness of breath


Condition: Stable


Hospital course: 





History of present illness: 





77-year-old female with history of CHF, COPD brought in by EMS from home with 

complaint of shortness of breath.  She is on home oxygen at 3 L and states that 

she began experiencing difficulty breathing this evening.  States she got up to 

go to the restroom and fell to the floor.  EMS arrived and placed patient on 

nonrebreather at 100%.  Patient reports that she recently saw her cardiologist 

who told her she would possibly need a thoracentesis.  Patient reports history 

of multiple thoracenteses in the past.





   WBC count is normal.  Patient is afebrile.  She was placed on BiPAP.  





***Avoid PT in this patient. Daughter explicitly stated patient should not be 

moving with therapist due to instructions to avoid strenuous activity from her 

OP cardiologist***





Hospital Course:


6/18: Clinically appears improved. Lung exam improved, but has significant 

pitting edema. Daughter states patient has gained 40 lbs in 6 weeks.  Will 

diurese with Lasix IV twice daily.  Plan for discharge home tomorrow if 

continues to show improvement.





6/19: medical stable for discharge. d/c home with azithromycin and prednisone 

course x 3 days. Discharge home with instructions to follow up with OP 

cardiology and pulmonary. Patient instructed to resume home inhalers for copd 

and GDMT medication for HF therapy.





Assessment and Plan:


#Acute on chronic respiratory failure with hypoxia


 - hx of copd on 3l/min O2 at home, "endstage" CHF


 ABG obtained with pH 7.33, PCO2 83, PO2 103, HCO3 43.


 - Chest x-ray shows opacities in the right middle lobe, possibly atelectasis or

infiltrate.


- etiology of admission likely multifactorial, likely copd exacerbation in the 

setting of some volume overload


- treatment with duonebs, budesonide, Solumedrol IV, and azithromyin


- Lasix 40 mg IV bid 





#Acute exacerbation of COPD with asthma


- management as above





#Chronic Systolic Heart Failure


- noted, doubt in acute exacerbation, 


- resume home coreg, lisinopril,


- diuresis with iv lasix, transition to oral tomorrow


- strict I/O's





#Obstructive sleep apnea


-bipap qhs








#Lower Extremity Edema


- lasix as above.


- has gained 40 lbs in last 6 weeks.





#CAD


- noted, resume home aspirin ,atorvastatin





#Hyperlipidemia


- atorvastatin as above





#Hypertension


- resume home meds as above: amlodipine, coreg, lisinopril, lasix.





#Morbid Obesity


- BMI : 41.6


- Counseled patient on the importance of weight loss, incorporating exercise, 

and dietary changes (lean meats, fresh fruits and vegetables, and water intake).

Patient expresses understanding. 


- Time: +15 min 





#Advance care planning


Disease education conducted, care plan discussed, diagnoses discussed, prognosis

discussed, patient is full code, patient acknowledges understanding and agree 

with care plan, discussed about patient clinical course and answered all 

questions to satisfaction. +30 minutes.


 +30 minutes.





The high probability of a clinically significant, sudden or life threatening 

deterioration of the [multi] system(s) required my full and direct attention, 

intervention and personal management. The aggregate critical care time was [60] 

minutes. This time is in addition to time spent performing reported procedures 

but includes the following: 





[x] Data Review and interpretation 





[x] Patient assessment and monitoring of vital signs 





[x] Documentation 





[x] Medication orders and management


Disposition: 01 HOME / SELF CARE / HOMELESS


Final Discharge Diagnosis (Prints w/discharge instructions): Acute on chronic 

respiratory failure. Acute exacerbation of COPD


Time spent for discharge: 35





Core Measure Documentation





- Palliative Care


Palliative Care/ Comfort Measures: Not Applicable





- Core Measures


Any of the following diagnoses?: none





Exam





- Physical Exam


Narrative exam: 





Physical Exam:


VITAL SIGNS:  Reviewed.    


GENERAL:  The patient appears normally developed, Vital signs as documented. on 

3l/min nc. obese


HEAD:  No signs of head trauma.


EYES:  Pupils are equal.  Extraocular motions intact.  


EARS:  Hearing grossly intact.


MOUTH:  Oropharynx is normal. 


NECK:  No adenopathy, no JVD.  


CHEST:  poor air movement.


CARDIAC:  Regular rate and rhythm.  S1 and S2, without murmurs, gallops, or 

rubs.


VASCULAR:   Peripheral pulses normal and equal in all extremities.


ABDOMEN:  Soft, non tender and non distended.  No   rebound or guarding, and no 

masses palpated.   Bowel Sounds normal.


MUSCULOSKELETAL:  Good range of motion of all major joints. Extremities without 

clubbing, cyanosis. significant pitting edema.


NEUROLOGIC EXAM:  Alert and oriented x 4. no focal sensory or strength deficits.

  


PSYCHIATRIC:  Mood normal.


SKIN:  detail exam as documented in skin assessment





- Constitutional


Vitals: 


                                        











Temp Pulse Resp BP Pulse Ox


 


 98.2 F   104 H  28 H  88/51   96 


 


 06/19/22 08:35  06/19/22 08:31  06/19/22 08:31  06/19/22 08:31  06/19/22 08:31














Plan


Follow up with: 


ALVARADO POWELL MD [Primary Care Provider] - 3-5 Days


Prescriptions: 


predniSONE [Deltasone] 40 mg PO QDAY 3 Days #3 tab


Azithromycin [Zithromax] 250 mg PO DAILY 3 Days #3 tab

## 2022-06-25 ENCOUNTER — HOSPITAL ENCOUNTER (INPATIENT)
Dept: HOSPITAL 5 - ED | Age: 77
LOS: 4 days | Discharge: HOME HEALTH | DRG: 175 | End: 2022-06-29
Attending: STUDENT IN AN ORGANIZED HEALTH CARE EDUCATION/TRAINING PROGRAM | Admitting: INTERNAL MEDICINE
Payer: MEDICARE

## 2022-06-25 DIAGNOSIS — E78.5: ICD-10-CM

## 2022-06-25 DIAGNOSIS — K21.9: ICD-10-CM

## 2022-06-25 DIAGNOSIS — I11.0: ICD-10-CM

## 2022-06-25 DIAGNOSIS — I48.21: ICD-10-CM

## 2022-06-25 DIAGNOSIS — E66.2: ICD-10-CM

## 2022-06-25 DIAGNOSIS — M19.90: ICD-10-CM

## 2022-06-25 DIAGNOSIS — E78.00: ICD-10-CM

## 2022-06-25 DIAGNOSIS — Z88.5: ICD-10-CM

## 2022-06-25 DIAGNOSIS — I50.33: ICD-10-CM

## 2022-06-25 DIAGNOSIS — J44.1: ICD-10-CM

## 2022-06-25 DIAGNOSIS — I25.10: ICD-10-CM

## 2022-06-25 DIAGNOSIS — J96.21: ICD-10-CM

## 2022-06-25 DIAGNOSIS — Z20.822: ICD-10-CM

## 2022-06-25 DIAGNOSIS — I26.99: Primary | ICD-10-CM

## 2022-06-25 PROCEDURE — 94760 N-INVAS EAR/PLS OXIMETRY 1: CPT

## 2022-06-25 PROCEDURE — 93306 TTE W/DOPPLER COMPLETE: CPT

## 2022-06-25 PROCEDURE — 80048 BASIC METABOLIC PNL TOTAL CA: CPT

## 2022-06-25 PROCEDURE — 71275 CT ANGIOGRAPHY CHEST: CPT

## 2022-06-25 PROCEDURE — 94660 CPAP INITIATION&MGMT: CPT

## 2022-06-25 PROCEDURE — 82103 ALPHA-1-ANTITRYPSIN TOTAL: CPT

## 2022-06-25 PROCEDURE — 85610 PROTHROMBIN TIME: CPT

## 2022-06-25 PROCEDURE — 81001 URINALYSIS AUTO W/SCOPE: CPT

## 2022-06-25 PROCEDURE — 5A09457 ASSISTANCE WITH RESPIRATORY VENTILATION, 24-96 CONSECUTIVE HOURS, CONTINUOUS POSITIVE AIRWAY PRESSURE: ICD-10-PCS | Performed by: INTERNAL MEDICINE

## 2022-06-25 PROCEDURE — 71045 X-RAY EXAM CHEST 1 VIEW: CPT

## 2022-06-25 PROCEDURE — 85027 COMPLETE CBC AUTOMATED: CPT

## 2022-06-25 PROCEDURE — 82565 ASSAY OF CREATININE: CPT

## 2022-06-25 PROCEDURE — 93005 ELECTROCARDIOGRAM TRACING: CPT

## 2022-06-25 PROCEDURE — U0003 INFECTIOUS AGENT DETECTION BY NUCLEIC ACID (DNA OR RNA); SEVERE ACUTE RESPIRATORY SYNDROME CORONAVIRUS 2 (SARS-COV-2) (CORONAVIRUS DISEASE [COVID-19]), AMPLIFIED PROBE TECHNIQUE, MAKING USE OF HIGH THROUGHPUT TECHNOLOGIES AS DESCRIBED BY CMS-2020-01-R: HCPCS

## 2022-06-25 PROCEDURE — 36415 COLL VENOUS BLD VENIPUNCTURE: CPT

## 2022-06-25 PROCEDURE — 93970 EXTREMITY STUDY: CPT

## 2022-06-25 PROCEDURE — 82803 BLOOD GASES ANY COMBINATION: CPT

## 2022-06-25 PROCEDURE — 83880 ASSAY OF NATRIURETIC PEPTIDE: CPT

## 2022-06-25 PROCEDURE — 94640 AIRWAY INHALATION TREATMENT: CPT

## 2022-06-25 PROCEDURE — 85379 FIBRIN DEGRADATION QUANT: CPT

## 2022-06-25 PROCEDURE — 80053 COMPREHEN METABOLIC PANEL: CPT

## 2022-06-25 PROCEDURE — C8929 TTE W OR WO FOL WCON,DOPPLER: HCPCS

## 2022-06-25 PROCEDURE — 85730 THROMBOPLASTIN TIME PARTIAL: CPT

## 2022-06-25 PROCEDURE — 83735 ASSAY OF MAGNESIUM: CPT

## 2022-06-26 LAB
ALBUMIN SERPL-MCNC: 3.7 G/DL (ref 3.9–5)
ALT SERPL-CCNC: 33 UNITS/L (ref 7–56)
BILIRUB UR QL STRIP: (no result)
BLOOD UR QL VISUAL: (no result)
BUN SERPL-MCNC: 15 MG/DL (ref 7–17)
BUN/CREAT SERPL: 25 %
CALCIUM SERPL-MCNC: 8.6 MG/DL (ref 8.4–10.2)
HCO3 BLDA-SCNC: 45.2 MMOL/L (ref 20–26)
HCT VFR BLD CALC: 33.9 % (ref 30.3–42.9)
HEMOLYSIS INDEX: 39
HGB BLD-MCNC: 10.3 GM/DL (ref 10.1–14.3)
MCHC RBC AUTO-ENTMCNC: 30 % (ref 30–34)
MCV RBC AUTO: 88 FL (ref 79–97)
PCO2 BLDA: 72.6 MM HG
PH BLDA: 7.41 PH UNITS (ref 7.35–7.45)
PH UR STRIP: 7 [PH] (ref 5–7)
PLATELET # BLD: 145 K/MM3 (ref 140–440)
PO2 BLDA: 73.9 MM HG (ref 80–90)
PROT UR STRIP-MCNC: (no result) MG/DL
RBC # BLD AUTO: 3.88 M/MM3 (ref 3.65–5.03)
RBC #/AREA URNS HPF: 1 /HPF (ref 0–6)
UROBILINOGEN UR-MCNC: < 2 MG/DL (ref ?–2)
WBC #/AREA URNS HPF: 2 /HPF (ref 0–6)

## 2022-06-26 PROCEDURE — 4A033R1 MEASUREMENT OF ARTERIAL SATURATION, PERIPHERAL, PERCUTANEOUS APPROACH: ICD-10-PCS | Performed by: INTERNAL MEDICINE

## 2022-06-26 RX ADMIN — Medication SCH ML: at 10:06

## 2022-06-26 RX ADMIN — ENOXAPARIN SODIUM SCH MG: 100 INJECTION SUBCUTANEOUS at 10:04

## 2022-06-26 RX ADMIN — ENOXAPARIN SODIUM SCH MG: 100 INJECTION SUBCUTANEOUS at 21:09

## 2022-06-26 RX ADMIN — ALBUTEROL SULFATE SCH MG: 2.5 SOLUTION RESPIRATORY (INHALATION) at 20:11

## 2022-06-26 RX ADMIN — FUROSEMIDE SCH MG: 10 INJECTION, SOLUTION INTRAVENOUS at 07:59

## 2022-06-26 RX ADMIN — FUROSEMIDE SCH MG: 10 INJECTION, SOLUTION INTRAVENOUS at 20:17

## 2022-06-26 RX ADMIN — Medication SCH ML: at 21:09

## 2022-06-26 NOTE — CONSULTATION
History of Present Illness


Consult date: 06/26/22


Consult reason: atrial fibrillation, congestive heart failure, hypertension


History of present illness: 





77-year-old  female who is presented with shortness of breath bilateral

pedal edema.  She has a history of chronic atrial fibrillation and congestive 

heart failure.  She was recently admitted to [facility] for 

elective thoracocentesis and had to be taken off her anticoagulation which she 

was on for atrial fibrillation.  Today has CT angiogram of the chest shows 

pulmonary embolism with associated elevation of D-dimer levels.


At the time of my evaluation patient appears comfortable denies any chest pains 

or shortness of breath and has diuresed significantly.





Past History


Past Medical History: CAD, COPD, GERD, heart failure, hypertension, 

hyperlipidemia, other (Anxiety and Depression)


Past Surgical History: Other (Tubal ligation)


Social history: no significant social history


Family history: no significant family history





Medications and Allergies


                                    Allergies











Allergy/AdvReac Type Severity Reaction Status Date / Time


 


codeine AdvReac  Dizziness Verified 06/18/22 08:23











                                Home Medications











 Medication  Instructions  Recorded  Confirmed  Last Taken  Type


 


Sertraline [Zoloft] 100 mg PO DAILY 08/06/15 07/30/20 08/21/17 History


 


Zolpidem [Ambien] 10 mg PO QHS 08/06/15 07/30/20 08/21/17 History


 


carvediloL [Coreg] 12.5 mg PO BID 08/06/15 07/30/20 08/22/17 05:30 History


 


lisinopriL [Zestril TAB] 20 mg PO DAILY 08/06/15 07/30/20 08/21/17 History





     b 


 


AtorvaSTATin [Lipitor] 40 mg PO QHS 07/30/20 07/30/20 Unknown History


 


Ergocalciferol (Vitamin D2) 50 mcg PO 1XW 07/30/20 07/30/20 Unknown History





[Vitamin D2]     


 


amLODIPine 5 mg PO DAILY 07/30/20 07/30/20 Unknown History


 


hydroCHLOROthiazide 25 mg PO DAILY 07/30/20 07/30/20 Unknown History





[Hydrochlorothiazide]     


 


Aspirin EC [Halfprin EC] 81 mg PO DAILY  tablet 08/01/20  Unknown Rx


 


Azithromycin [Zithromax Z-JAYME] 0 mg PO DAILY #1 tab 08/01/20  Unknown Rx


 


Benzonatate [Tessalon Perles] 100 mg PO Q8HR #30 capsule 08/01/20  Unknown Rx


 


Meclizine [Antivert] 25 mg PO TID PRN  tablet 08/01/20  Unknown Rx


 


Prednisone [predniSONE 10 mg 10 mg PO .TAPER #1 tab.ds.pk 08/01/20  Unknown Rx





(6-Day Pack, 21 Tabs)]     


 


Azithromycin [Zithromax] 250 mg PO DAILY 3 Days #3 tab 06/19/22  Unknown Rx


 


predniSONE [Deltasone] 40 mg PO QDAY 3 Days #3 tab 06/19/22  Unknown Rx











Active Meds: 


Active Medications





Acetaminophen (Acetaminophen 325 Mg Tab)  650 mg PO Q4H PRN


   PRN Reason: Pain MILD(1-3)/Fever >100.5/HA


Enoxaparin Sodium (Enoxaparin 100 Mg/1 Ml Inj)  100 mg 1 mg/kg (100 mg) SUB-Q 

Q12HR [facility]; Protocol


Furosemide (Furosemide 40 Mg/4 Ml Inj)  40 mg IV BID@0600,1800 [facility]


   Last Admin: 06/26/22 07:59 Dose:  40 mg


   


Magnesium Hydroxide (Magnesium Hydroxide (Mom) Oral Liqd Udc)  30 ml PO Q4H PRN


   PRN Reason: Constipation


Morphine Sulfate (Morphine 2 Mg/1 Ml Inj)  2 mg IV Q4H PRN


   PRN Reason: Pain, Moderate (4-6)


Morphine Sulfate (Morphine 4 Mg/1 Ml Inj)  4 mg IV Q4H PRN


   PRN Reason: Pain , Severe (7-10)


Ondansetron HCl (Ondansetron 4 Mg/2 Ml Inj)  4 mg IV Q8H PRN


   PRN Reason: Nausea And Vomiting


Sodium Chloride (Sodium Chloride 0.9% 10 Ml Flush Syringe)  10 ml IV BID [facility]


Sodium Chloride (Sodium Chloride 0.9% 10 Ml Flush Syringe)  10 ml IV PRN PRN


   PRN Reason: LINE FLUSH











Review of Systems


Constitutional: weight gain, chills, fatigue


Ears, nose, mouth and throat: no ear pain, no ear discharge, no tinnitis


Breasts: deferred


Cardiovascular: orthopnea, edema, shortness of breath, dyspnea on exertion, leg 

edema, no chest pain, no syncope


Respiratory: cough, shortness of breath, dyspnea on exertion


Gastrointestinal: no nausea, no vomiting, no diarrhea, no melena


Genitourinary Female: no pelvic pain, no flank pain, no menorrhagia, no dysuria,

 no urinary frequency


Rectal: no pain, no incontinence, no bleeding


Musculoskeletal: no neck stiffness, no neck pain, no shooting leg pain, no hot 

joints


Integumentary: no rash, no pruritis, no redness


Neurological: no transient paralysis, no paralysis, no weakness, no parathesias,

 no numbness


Endocrine: no cold intolerance, no heat intolerance, no polyphagia, no excessive

 thirst, no polydipsia, no polyuria


Hematologic/Lymphatic: no easy bruising, no easy bleeding


Allergic/Immunologic: no urticaria, no allergic rhinitis, no wheezing





Physical Examination


                                   Vital Signs











Temp Pulse Resp BP Pulse Ox


 


 98 F   82   22   121/75   96 


 


 06/25/22 23:18  06/25/22 23:18  06/25/22 23:18  06/25/22 23:18  06/25/22 23:18











General appearance: no acute distress, obese


HEENT: Positive: PERRL, Normocephaly, Mucus Membranes Moist


Neck: Positive: neck supple, trachea midline, JVD/HJR


Cardiac: Positive: irregularly irregular, S1/S2, S3, PMI, Dilated, Laterally 

Displaced


Lungs: Positive: Rales.  Negative: Rhonchi


Neuro: Positive: Grossly Intact


Abdomen: Positive: Soft, Active Bowel Sounds


Skin: Negative: Rash


Extremities: Present: +1 Edema





Results





                                 06/25/22 23:41





                                 06/25/22 23:41


                                 Cardiac Enzymes











  06/25/22 Range/Units





  23:41 


 


AST  28  (5-40)  units/L








                                       CBC











  06/25/22 Range/Units





  23:41 


 


WBC  10.9  (4.5-11.0)  K/mm3


 


RBC  3.88  (3.65-5.03)  M/mm3


 


Hgb  10.3  (10.1-14.3)  gm/dl


 


Hct  33.9  (30.3-42.9)  %


 


Plt Count  145  (140-440)  K/mm3








                          Comprehensive Metabolic Panel











  06/25/22 Range/Units





  23:41 


 


Sodium  141  (137-145)  mmol/L


 


Potassium  3.9  (3.6-5.0)  mmol/L


 


Chloride  92.8 L  ()  mmol/L


 


Carbon Dioxide  39 H  (22-30)  mmol/L


 


BUN  15  (7-17)  mg/dL


 


Creatinine  0.6  (0.6-1.2)  mg/dL


 


Glucose  191 H  ()  mg/dL


 


Calcium  8.6  (8.4-10.2)  mg/dL


 


AST  28  (5-40)  units/L


 


ALT  33  (7-56)  units/L


 


Alkaline Phosphatase  95  ()  units/L


 


Total Protein  5.6 L  (6.3-8.2)  g/dL


 


Albumin  3.7 L  (3.9-5)  g/dL














EKG interpretations





- Telemetry


EKG Rhythm: Atrial Fibrillation





Assessment and Plan





1.  Acute pulmonary embolism


2.  Permanent atrial fibrillation


3.  Chronic combined systolic and diastolic heart failure


4.  Chronic obstructive pulmonary disease


5.  Coronary artery disease


6.  Essential hypertension


7.  Hyperlipidemia


8.  Pleural effusion left side





Plan.


Patient clinically is hemodynamically stable and has been started on 

anticoagulation.  We shall resume her Eliquis as soon as possible.  

Echocardiogram will be done to assess both LV and RV function.


Obtain records from [facility] regarding recent cardiac work-up in

 the office and at [facility]

## 2022-06-26 NOTE — CONSULTATION
History of Present Illness


Consult date: 06/26/22


Reason for consult: dyspnea, asthma, pleural effusion, pulmonary embolism, 

obstructive sleep apnea


History of present illness: 





77-year-old white female with known history of CHF, COPD, coronary artery 

disease and hyperlipidemia, GERD  presents to the emergency room via EMS for 

shortness of breath.  Oxygen saturation was about 85% upon arrival EMS and 

patient was subsequently placed on CPAP in route to the hospital.


Upon arrival in the emergency room patient was switched to BiPAP. Patient also 

has history of Sleep apnea. She uses CPAP at home.


She denies any fever or chills, no chest pain, no nausea vomiting, no abdominal 

pain.  Denies any headache or dizziness and denies any diaphoresis.


Patient has history of Anxiety and depression. 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

asthma. Patient has no history of smoking, alcohol or drug abuse. Patient works 

in [employer] for [employer].  and has three children.





Daughter was by the bedside indicated that patient was just discharged from 

[facility] few days ago after being admitted for pleural effusion and CHF 

exacerbation.  Patient thoracentesis done while at [facility].


Patient was also placed on a course of oral antibiotics after discharge from 

[facility].





Work-up in the ER , chest x-ray shows no significant change.





CT angiogram of the chest shows pulmonary emboli within the subsegmental 

branches of the left upper lobe, small to moderate right pleural effusion with 

adjacent atelectasis and indwelling catheter, cardiomegaly small amount of p

erihepatic and perisplenic fluid.


Lab reveals BNP of 2950 D-dimer 1852.





Patient still complaining some shortness of breath.Patient has been commenced on

anticoagulation and diuretics.





Patient is on 3 litres O2. O2 saturation 100%.Patient afebrile. No leukocytosis.

Blood pressure 109/67, Pulse 90, Respirations 14.





  





Past History


Past Medical History: CAD, COPD, GERD, heart failure, hypertension, 

hyperlipidemia, other (Anxiety and Depression, Sleep apnea.)


Past Surgical History: Other (Tubal ligation)


Social history: no significant social history


Family history: no significant family history





Medications and Allergies


                                    Allergies











Allergy/AdvReac Type Severity Reaction Status Date / Time


 


codeine AdvReac  Dizziness Verified 06/18/22 08:23











                                Home Medications











 Medication  Instructions  Recorded  Confirmed  Last Taken  Type


 


Sertraline [Zoloft] 100 mg PO DAILY 08/06/15 06/26/22 06/25/22 10:00 History


 


Zolpidem [Ambien] 10 mg PO QHS 08/06/15 06/26/22 06/25/22 23:00 History


 


AtorvaSTATin [Lipitor] 40 mg PO QDAY 07/30/20 06/26/22 06/25/22 10:00 History


 


Ergocalciferol (Vitamin D2) 50 mcg PO 1XW 07/30/20 06/26/22 06/01/22 10:00 

History





[Vitamin D2]     


 


Aspirin EC [Halfprin EC] 81 mg PO DAILY  tablet 08/01/20 06/26/22 06/12/22 10:00

Rx


 


Benzonatate [Tessalon Perles] 100 mg PO Q8HR #30 capsule 08/01/20 06/26/22 06/22/22 10:00 Rx


 


Azithromycin [Zithromax] 250 mg PO DAILY 3 Days #3 tab 06/19/22 06/26/22 06/25/22 10:00 Rx











Active Meds: 


Active Medications





Acetaminophen (Acetaminophen 325 Mg Tab)  650 mg PO Q4H PRN


   PRN Reason: Pain MILD(1-3)/Fever >100.5/HA


Enoxaparin Sodium (Enoxaparin 100 Mg/1 Ml Inj)  100 mg 1 mg/kg (100 mg) SUB-Q 

Q12HR [facility]; Protocol


   Last Admin: 06/26/22 10:04 Dose:  100 mg


   


Furosemide (Furosemide 40 Mg/4 Ml Inj)  40 mg IV BID@0600,1800 [facility]


   Last Admin: 06/26/22 07:59 Dose:  40 mg


   


Magnesium Hydroxide (Magnesium Hydroxide (Mom) Oral Liqd Udc)  30 ml PO Q4H PRN


   PRN Reason: Constipation


Morphine Sulfate (Morphine 2 Mg/1 Ml Inj)  2 mg IV Q4H PRN


   PRN Reason: Pain, Moderate (4-6)


Morphine Sulfate (Morphine 4 Mg/1 Ml Inj)  4 mg IV Q4H PRN


   PRN Reason: Pain , Severe (7-10)


Ondansetron HCl (Ondansetron 4 Mg/2 Ml Inj)  4 mg IV Q8H PRN


   PRN Reason: Nausea And Vomiting


Sodium Chloride (Sodium Chloride 0.9% 10 Ml Flush Syringe)  10 ml IV BID [facility]


   Last Admin: 06/26/22 10:06 Dose:  10 ml


   


Sodium Chloride (Sodium Chloride 0.9% 10 Ml Flush Syringe)  10 ml IV PRN PRN


   PRN Reason: LINE FLUSH











Review of Systems


All systems: negative





Physical Examination


Vital signs: 


                                   Vital Signs











Temp Pulse Resp BP Pulse Ox


 


 98 F   82   22   121/75   96 


 


 06/25/22 23:18  06/25/22 23:18  06/25/22 23:18  06/25/22 23:18  06/25/22 23:18











General appearance: no acute distress, alert


Eyes: non-icteric


ENT: oropharynx moist


Neck: supple, no JVD


Effort: mildly labored


Ascultation: Bilateral: wheezes (Occasional wheezing.)


Cardiovascular: regular rate and rhythm


Gastrointestinal: normoactive bowel sounds, soft, non-tender


Integumentary: normal


Extremities: no cyanosis, no edema


Musculoskeletal: no deformities


Gait: other (Patient resting in bed.)


normal mental status, non-focal exam, pupils equal and round


mood appropriate, affect normal





Results





- Laboratory Findings


CBC and BMP: 


                                 06/25/22 23:41





                                 06/25/22 23:41


ABG











ABG pH  7.412 pH Units (7.350-7.450)   06/25/22  23:40    


 


ABG pCO2  72.6 mm Hg  06/25/22  23:40    


 


ABG pO2  73.9 mm Hg (80.0-90.0)  L  06/25/22  23:40    


 


ABG O2 Saturation  97.3 % (95.0-99.0)   06/25/22  23:40    





PT/INR, D-dimer











D-Dimer  1852.16 ng/mlDDU (0-234)  H  06/25/22  23:41    








Abnormal lab findings: 


                                  Abnormal Labs











  06/25/22 06/25/22 06/25/22





  23:40 23:41 23:41


 


MCH   27 L 


 


RDW   16.4 H 


 


D-Dimer    1852.16 H


 


ABG pO2  73.9 L  


 


ABG HCO3  45.2 H  


 


ABG Base Excess  17.8 H  


 


ABG Hemoglobin  10.0 L  


 


Chloride   


 


Carbon Dioxide   


 


Glucose   


 


NT-Pro-B Natriuret Pep   


 


Total Protein   


 


Albumin   














  06/25/22





  23:41


 


MCH 


 


RDW 


 


D-Dimer 


 


ABG pO2 


 


ABG HCO3 


 


ABG Base Excess 


 


ABG Hemoglobin 


 


Chloride  92.8 L


 


Carbon Dioxide  39 H


 


Glucose  191 H


 


NT-Pro-B Natriuret Pep  2950 H


 


Total Protein  5.6 L


 


Albumin  3.7 L














- Diagnostic Findings


Chest x-ray: report reviewed, image reviewed


Additional studies: 





CHEST 1 VIEW 6/25/2022 11:39 PM  


 


 INDICATION / CLINICAL INFORMATION: HX OF CHF PRESENTS W/ SOB.  


 


 COMPARISON: 6/18/2022  


 


 FINDINGS:  


 


 SUPPORT DEVICES: None.  


 HEART / MEDIASTINUM: Stable.   


 LUNGS / PLEURA: Redemonstrated right basilar opacity. No pneumothorax.   


 


 ADDITIONAL FINDINGS: No significant additional findings.  


 


 IMPRESSION:  


 1. No significant change.  


 





Assessment and Plan





77-year-old white female with known history of CHF, COPD, coronary artery 

disease and hyperlipidemia, GERD  presents to the emergency room via EMS for 

shortness of breath.  Oxygen saturation was about 85% upon arrival EMS and 

patient was subsequently placed on CPAP in route to the hospital.


Upon arrival in the emergency room patient was switched to BiPAP. Patient also 

has history of Sleep apnea. She uses CPAP at home.


She denies any fever or chills, no chest pain, no nausea vomiting, no abdominal 

pain.  Denies any headache or dizziness and denies any diaphoresis.


Patient has history of Anxiety and depression. 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

 asthma. Patient has no history of smoking, alcohol or drug abuse. Patient works

 in [employer] for [employer].  and has three children.





Daughter was by the bedside indicated that patient was just discharged from 

[facility] few days ago after being admitted for pleural effusion and CHF 

exacerbation.  Patient thoracentesis done while at [facility].


Patient was also placed on a course of oral antibiotics after discharge from 

[facility].





Work-up in the ER , chest x-ray shows no significant change.





CT angiogram of the chest shows pulmonary emboli within the subsegmental 

branches of the left upper lobe, small to moderate right pleural effusion with 

adjacent atelectasis and indwelling catheter, cardiomegaly small amount of 

perihepatic and perisplenic fluid.


Lab reveals BNP of 2950 D-dimer 1852.





Patient still complaining some shortness of breath.Patient has been commenced on

 anticoagulation and diuretics.





Patient is on 3 litres O2. O2 saturation 100%.Patient afebrile. No leukocytosis.

 Blood pressure 109/67, Pulse 90, Respirations 14.





I spent critical care time of 50 minutes, obtaining history, review the chart, 

Examine the patient , review xrays and lab results, talking to respiratory 

therapy and nursing staff and work up plan of treatment in this critically ill 

patient.





- Patient Problems


(1) Acute and chronic respiratory failure with hypoxia


Current Visit: No   Status: Acute   


Plan to address problem: 


O2 3 litres via nasal canula 


 CPAP during night time.


 S/C Lovenox 100 mg  q 12 hours.


 Recommend albuterol inhaler 2 puffs po qid.








(2) Acute exacerbation of CHF (congestive heart failure)


Current Visit: Yes   Status: Acute   


Plan to address problem: 


Patient is on Lasix.


 Management as per cardiology.








(3) Pulmonary embolism


Current Visit: Yes   Status: Acute   


Plan to address problem: 


Patient started on S/c Lovenox 100 mg q 12 hours.








(4) Acute exacerbation of COPD with asthma


Current Visit: No   Status: Acute   


Plan to address problem: 


Likely asthma.


Patient has no history of smoking or Occupational [place].


Recommend Albuterol inhaler 2 puffs po qid.


Recommend Alpha1 antitrypsin level.


PFTs as out patient.








(5) Hypertension


Current Visit: No   Status: Chronic   


Plan to address problem: 


Management as per primary care.








(6) Obesity


Current Visit: No   Status: Chronic   


Plan to address problem: 


Counseled to loose weight.


 Diet and Exercise.








(7) Sleep apnea in adult


Current Visit: Yes   Status: Acute   


Plan to address problem: 


CPAP as she is using at home.








(8) Obesity with alveolar hypoventilation


Current Visit: Yes   Status: Acute   


Plan to address problem: 


Recocommend BIPAP  16/6, rate 20, FIO2 35%


 Recommend to loose weight.


 Recommend to repeat sleep study, since we donot have any results from previous 

sleep study.


 Recommend T4, Free T4 and TSH.

## 2022-06-26 NOTE — EVENT NOTE
Date: 06/26/22


Patient seen and examined second IMS visit today.  Diagnosed with pulmonary 

embolism.  Patient was recently in the hospital due to shortness of breath at 

[facility] and right-sided pleuralvacs was placed to manage recurrent pleural 

effusion.  The patient she has had 7 thoracentesis in the past.  She denies any 

known history of malignancy.  Family at bedside corroborates her story.  We will

request for records from [facility].  Continue IMCU care anticipate discharge in 

24 to 48 hours once back to her baseline.

## 2022-06-26 NOTE — HISTORY AND PHYSICAL REPORT
History of Present Illness


Date of examination: 06/26/22


Date of admission: 


06/26/2022


Chief complaint: 





Shortness of Breath


History of present illness: 





77-year-old white female with known history of CHF, COPD, coronary artery 

disease and hyperlipidemia presents to the emergency room via EMS today for 

shortness of breath.  Oxygen saturation was about 85% upon arrival EMS and 

patient was subsequently placed on CPAP in route to the hospital.


Upon arrival in the emergency room patient was switched to BiPAP.


She denies any fever or chills, no chest pain, no nausea vomiting, no abdominal 

pain.  Denies any headache or dizziness and denies any diaphoresis.





Daughter was by the bedside indicates that patient was just discharged from 

[facility] few days ago after being admitted for pleural effusion and CHF 

exacerbation.  Patient thoracentesis done while at [facility].


Patient was also placed on a course of oral antibiotics after discharge from 

[facility].





Work-up in the ER today, chest x-ray shows no significant change.





CT angiogram of the chest shows pulmonary emboli within the subsegmental 

branches of the left upper lobe, small to moderate right pleural effusion with 

adjacent atelectasis and indwelling catheter, cardiomegaly small amount of 

perihepatic and perisplenic fluid.


Lab reveals BNP of 2950 D-dimer 1852.





Patient has been commenced on anticoagulation and diuretics.





Past History


Past Medical History: CAD, COPD, GERD, heart failure, hypertension, 

hyperlipidemia, other (Anxiety and Depression)


Past Surgical History: Other (Tubal ligation)


Social history: no significant social history


Family history: no significant family history





Medications and Allergies


                                    Allergies











Allergy/AdvReac Type Severity Reaction Status Date / Time


 


codeine AdvReac  Dizziness Verified 06/18/22 08:23











                                Home Medications











 Medication  Instructions  Recorded  Confirmed  Last Taken  Type


 


Sertraline [Zoloft] 100 mg PO DAILY 08/06/15 07/30/20 08/21/17 History


 


Zolpidem [Ambien] 10 mg PO QHS 08/06/15 07/30/20 08/21/17 History


 


carvediloL [Coreg] 12.5 mg PO BID 08/06/15 07/30/20 08/22/17 05:30 History


 


lisinopriL [Zestril TAB] 20 mg PO DAILY 08/06/15 07/30/20 08/21/17 History





     b 


 


AtorvaSTATin [Lipitor] 40 mg PO QHS 07/30/20 07/30/20 Unknown History


 


Ergocalciferol (Vitamin D2) 50 mcg PO 1XW 07/30/20 07/30/20 Unknown History





[Vitamin D2]     


 


amLODIPine 5 mg PO DAILY 07/30/20 07/30/20 Unknown History


 


hydroCHLOROthiazide 25 mg PO DAILY 07/30/20 07/30/20 Unknown History





[Hydrochlorothiazide]     


 


Aspirin EC [Halfprin EC] 81 mg PO DAILY  tablet 08/01/20  Unknown Rx


 


Azithromycin [Zithromax Z-JAYME] 0 mg PO DAILY #1 tab 08/01/20  Unknown Rx


 


Benzonatate [Tessalon Perles] 100 mg PO Q8HR #30 capsule 08/01/20  Unknown Rx


 


Meclizine [Antivert] 25 mg PO TID PRN  tablet 08/01/20  Unknown Rx


 


Prednisone [predniSONE 10 mg 10 mg PO .TAPER #1 tab.ds.pk 08/01/20  Unknown Rx





(6-Day Pack, 21 Tabs)]     


 


Azithromycin [Zithromax] 250 mg PO DAILY 3 Days #3 tab 06/19/22  Unknown Rx


 


predniSONE [Deltasone] 40 mg PO QDAY 3 Days #3 tab 06/19/22  Unknown Rx











Active Meds: 


Active Medications





Acetaminophen (Acetaminophen 325 Mg Tab)  650 mg PO Q4H PRN


   PRN Reason: Pain MILD(1-3)/Fever >100.5/HA


Enoxaparin Sodium (Enoxaparin 100 Mg/1 Ml Inj)  100 mg 1 mg/kg (100 mg) SUB-Q 

Q12HR BRANDY; Protocol


Furosemide (Furosemide 40 Mg/4 Ml Inj)  40 mg IV BID@0600,1800 [facility]


Magnesium Hydroxide (Magnesium Hydroxide (Mom) Oral Liqd Udc)  30 ml PO Q4H PRN


   PRN Reason: Constipation


Morphine Sulfate (Morphine 2 Mg/1 Ml Inj)  2 mg IV Q4H PRN


   PRN Reason: Pain, Moderate (4-6)


Morphine Sulfate (Morphine 4 Mg/1 Ml Inj)  4 mg IV Q4H PRN


   PRN Reason: Pain , Severe (7-10)


Ondansetron HCl (Ondansetron 4 Mg/2 Ml Inj)  4 mg IV Q8H PRN


   PRN Reason: Nausea And Vomiting


Sodium Chloride (Sodium Chloride 0.9% 10 Ml Flush Syringe)  10 ml IV BID BRANDY


Sodium Chloride (Sodium Chloride 0.9% 10 Ml Flush Syringe)  10 ml IV PRN PRN


   PRN Reason: LINE FLUSH











Review of Systems


Constitutional: no fever, no chills


Ears, nose, mouth and throat: no nasal congestion, no sore throat


Cardiovascular: no chest pain, no palpitations


Respiratory: shortness of breath, no cough, no wheezing


Gastrointestinal: no abdominal pain, no nausea, no vomiting, no diarrhea


Genitourinary Female: no pelvic pain, no flank pain, no dysuria, no hematuria


Musculoskeletal: no neck pain, no low back pain


Integumentary: no rash, no pruritis


Neurological: no headaches, no confusion


Psychiatric: no anxiety, no depression


Endocrine: no polyphagia, no polydipsia, no polyuria, no nocturia





Exam





- Constitutional


Vitals: 


                                        











Temp Pulse Resp BP Pulse Ox


 


 97.9 F   88   15   143/73   100 


 


 06/26/22 03:56  06/26/22 03:56  06/26/22 03:56  06/26/22 03:56  06/26/22 03:56











General appearance: Present: mild distress, well-nourished, obese





- EENT


Eyes: Present: PERRL, EOM intact.  Absent: scleral icterus


ENT: hearing intact, clear oral mucosa, dentition normal





- Neck


Neck: Present: supple, normal ROM





- Respiratory


Respiratory effort: normal


Respiratory: bilateral: rales





- Cardiovascular


Rhythm: regular


Heart Sounds: Present: S1 & S2, systolic murmur, diastolic murmur





- Extremities


Extremities: no ischemia, pulses intact, normal temperature, Full ROM


Extremity abnormal: edema (3+ aliyah. lower extremity edema)


Peripheral Pulses: within normal limits





- Abdominal


General gastrointestinal: Present: soft, non-tender, non-distended, normal bowel

 sounds.  Absent: mass





- Integumentary


Integumentary: Present: clear, warm, dry, normal turgor.  Absent: rash





- Musculoskeletal


Musculoskeletal: strength equal bilaterally





- Psychiatric


Psychiatric: appropriate mood/affect, intact judgment & insight, memory intact, 

cooperative





- Neurologic


Neurologic: CNII-XII intact, no focal deficits, moves all extremities





Results





- Labs


CBC & Chem 7: 


                                 06/25/22 23:41





                                 06/25/22 23:41


Labs: 


                              Abnormal lab results











  06/25/22 06/25/22 06/25/22 Range/Units





  23:40 23:41 23:41 


 


MCH   27 L   (28-32)  pg


 


RDW   16.4 H   (13.2-15.2)  %


 


D-Dimer    1852.16 H  (0-234)  ng/mlDDU


 


ABG pO2  73.9 L    (80.0-90.0)  mm Hg


 


ABG HCO3  45.2 H    (20.0-26.0)  mmol/L


 


ABG Base Excess  17.8 H    (-2.0-3.0)  mmol/L


 


ABG Hemoglobin  10.0 L    (12.0-16.0)  gm/dl


 


Chloride     ()  mmol/L


 


Carbon Dioxide     (22-30)  mmol/L


 


Glucose     ()  mg/dL


 


NT-Pro-B Natriuret Pep     (0-900)  pg/mL


 


Total Protein     (6.3-8.2)  g/dL


 


Albumin     (3.9-5)  g/dL














  06/25/22 Range/Units





  23:41 


 


MCH   (28-32)  pg


 


RDW   (13.2-15.2)  %


 


D-Dimer   (0-234)  ng/mlDDU


 


ABG pO2   (80.0-90.0)  mm Hg


 


ABG HCO3   (20.0-26.0)  mmol/L


 


ABG Base Excess   (-2.0-3.0)  mmol/L


 


ABG Hemoglobin   (12.0-16.0)  gm/dl


 


Chloride  92.8 L  ()  mmol/L


 


Carbon Dioxide  39 H  (22-30)  mmol/L


 


Glucose  191 H  ()  mg/dL


 


NT-Pro-B Natriuret Pep  2950 H  (0-900)  pg/mL


 


Total Protein  5.6 L  (6.3-8.2)  g/dL


 


Albumin  3.7 L  (3.9-5)  g/dL














Assessment and Plan





Assessment:





1.  Acute respiratory failurepossibly secondary to CHF/COPD





2.  CHF exacerbation





3.  Pulmonary emboli





4.  Hypertension








Plan:





1.  Patient admitted and placed on anticoagulation with Lovenox





2.  Patient placed on diuretics.  Will monitor inputs and outputs and also 

monitor daily weight patient





3.  Patient follows up with cardiologist Patient follows up with CHARLOTTE Callahan








4.  We will schedule for echocardiogram.





DVT Prophylaxis: Currently on subcutaneous Lovenox





Code Status: Full Code.

## 2022-06-26 NOTE — XRAY REPORT
CHEST 1 VIEW 6/25/2022 11:39 PM



INDICATION / CLINICAL INFORMATION: HX OF CHF PRESENTS W/ SOB.



COMPARISON: 6/18/2022



FINDINGS:



SUPPORT DEVICES: None.

HEART / MEDIASTINUM: Stable. 

LUNGS / PLEURA: Redemonstrated right basilar opacity. No pneumothorax. 



ADDITIONAL FINDINGS: No significant additional findings.



IMPRESSION:

1. No significant change.



Signer Name: Junior Maza DO 

Signed: 6/26/2022 12:41 AM

Workstation Name: [id number]

## 2022-06-27 LAB
BUN SERPL-MCNC: 10 MG/DL (ref 7–17)
BUN/CREAT SERPL: 25 %
CALCIUM SERPL-MCNC: 8.5 MG/DL (ref 8.4–10.2)
HCT VFR BLD CALC: 33.8 % (ref 30.3–42.9)
HEMOLYSIS INDEX: 9
HGB BLD-MCNC: 10.1 GM/DL (ref 10.1–14.3)
MCHC RBC AUTO-ENTMCNC: 30 % (ref 30–34)
MCV RBC AUTO: 88 FL (ref 79–97)
PLATELET # BLD: 121 K/MM3 (ref 140–440)
RBC # BLD AUTO: 3.83 M/MM3 (ref 3.65–5.03)

## 2022-06-27 RX ADMIN — ZOLPIDEM TARTRATE SCH MG: 5 TABLET ORAL at 21:04

## 2022-06-27 RX ADMIN — Medication SCH ML: at 09:39

## 2022-06-27 RX ADMIN — BUDESONIDE SCH MG: 0.5 INHALANT RESPIRATORY (INHALATION) at 21:00

## 2022-06-27 RX ADMIN — ENOXAPARIN SODIUM SCH MG: 100 INJECTION SUBCUTANEOUS at 09:39

## 2022-06-27 RX ADMIN — Medication SCH ML: at 21:05

## 2022-06-27 RX ADMIN — FUROSEMIDE SCH MG: 10 INJECTION, SOLUTION INTRAVENOUS at 17:01

## 2022-06-27 RX ADMIN — ARFORMOTEROL TARTRATE SCH MCG: 15 SOLUTION RESPIRATORY (INHALATION) at 21:00

## 2022-06-27 RX ADMIN — BENZONATATE SCH MG: 100 CAPSULE ORAL at 21:05

## 2022-06-27 RX ADMIN — BENZONATATE SCH MG: 100 CAPSULE ORAL at 15:13

## 2022-06-27 RX ADMIN — ALBUTEROL SULFATE SCH MG: 2.5 SOLUTION RESPIRATORY (INHALATION) at 08:49

## 2022-06-27 RX ADMIN — ALBUTEROL SULFATE SCH MG: 2.5 SOLUTION RESPIRATORY (INHALATION) at 03:57

## 2022-06-27 RX ADMIN — ALBUTEROL SULFATE SCH: 2.5 SOLUTION RESPIRATORY (INHALATION) at 21:05

## 2022-06-27 RX ADMIN — FUROSEMIDE SCH MG: 10 INJECTION, SOLUTION INTRAVENOUS at 05:30

## 2022-06-27 RX ADMIN — ALBUTEROL SULFATE SCH MG: 2.5 SOLUTION RESPIRATORY (INHALATION) at 14:37

## 2022-06-27 RX ADMIN — ENOXAPARIN SODIUM SCH MG: 100 INJECTION SUBCUTANEOUS at 21:04

## 2022-06-27 NOTE — PROGRESS NOTE
Assessment and Plan





- Patient Problems


(1) Respiratory insufficiency


Current Visit: Yes   Status: Acute   


Plan to address problem: 


Patient presents with respiratory insufficiency, due to exacerbation of COPD.  

She has chronic right pleural effusion, and chronic atrial fibrillation.  With 

respect to atrial fibrillation, is on rate control strategy and chronic 

anticoagulation.  Cardiogram on this presentation showed normal left ventricular

systolic function, ejection fraction 60 to 65%.





We will continue rate management of atrial fibrillation and plan for continued 

long-term oral anticoagulation.








Subjective


Date of service: 06/27/22


Principal diagnosis: Shortness of breath, respiratory insufficiency


Interval history: 





Patient is on bedrest in the ICU, asleep, on CPAP.  No new cardiac events 

reported.  On telemetry monitor, she has atrial fibrillation, with ventricular 

rate in the 90s.





Objective


                                   Vital Signs











  Temp Pulse Pulse Pulse Resp Resp BP


 


 06/27/22 12:00   92 H    18   99/71


 


 06/27/22 11:42   93 H     


 


 06/27/22 11:41  97 F L      


 


 06/27/22 11:00   86    16   103/54


 


 06/27/22 10:00   104 H    21   113/81


 


 06/27/22 09:05       


 


 06/27/22 09:00   95 H    18   119/69


 


 06/27/22 08:50    92 H    18 


 


 06/27/22 08:00  97.8 F  96 H    15   114/66


 


 06/27/22 07:00   105 H    21   120/77


 


 06/27/22 06:00   84    22   113/67


 


 06/27/22 05:00   96 H    21   105/69


 


 06/27/22 04:42       


 


 06/27/22 04:20  97.8 F      


 


 06/27/22 04:00   87    18   105/72


 


 06/27/22 03:58   76    14  


 


 06/27/22 03:57    80    12 


 


 06/27/22 03:38       


 


 06/27/22 03:00   78    12   89/54


 


 06/27/22 02:00   85    10 L   91/52


 


 06/27/22 01:00   80    17   93/49


 


 06/27/22 00:09  97.3 F L      


 


 06/27/22 00:06   81    21   83/43


 


 06/27/22 00:00   79    19   83/43


 


 06/26/22 23:00   91 H    13   103/74


 


 06/26/22 22:00   103 H    15   105/73


 


 06/26/22 21:50   80    18  


 


 06/26/22 21:00   85    15   96/72


 


 06/26/22 20:32  99.2 F      


 


 06/26/22 20:17    92 H    23 


 


 06/26/22 20:00   80    20   108/76


 


 06/26/22 19:52       


 


 06/26/22 19:00   86    36 H   109/80


 


 06/26/22 18:20   87    20   108/76


 


 06/26/22 18:10   85    22   108/76


 


 06/26/22 18:00   88    25 H   108/76


 


 06/26/22 17:50   93 H    21   109/67


 


 06/26/22 17:40   93 H    22   109/67


 


 06/26/22 17:30   99 H    17   109/67


 


 06/26/22 17:20   86    17   109/67


 


 06/26/22 17:10   90    18   109/67


 


 06/26/22 17:00   89    15   109/67


 


 06/26/22 16:50   89    18   111/74


 


 06/26/22 16:40   97 H    15   111/74


 


 06/26/22 16:30   86    17   109/71


 


 06/26/22 16:20   89    20   111/74


 


 06/26/22 16:10   80    31 H   111/74


 


 06/26/22 16:00   86   86  21   111/74


 


 06/26/22 15:50   79    16   106/78


 


 06/26/22 15:40   77    27 H   106/78


 


 06/26/22 15:30   79    23   106/78


 


 06/26/22 15:20   76    21   106/78


 


 06/26/22 15:10   78    18   106/78


 


 06/26/22 15:00   82    20   106/78


 


 06/26/22 14:50   85    22   95/83


 


 06/26/22 14:40   82    19   95/83


 


 06/26/22 14:30   83    21   95/83


 


 06/26/22 14:20   79    22   95/83


 


 06/26/22 14:10   87    21   95/83


 


 06/26/22 14:00   81    22   95/83


 


 06/26/22 13:50   90    13   110/82


 


 06/26/22 13:40   85    15   110/82


 


 06/26/22 13:30   87    19   110/82


 


 06/26/22 13:20   83    16   110/82


 


 06/26/22 13:10   83    19   110/82


 


 06/26/22 13:00   80    21   110/82


 


 06/26/22 12:50   78    19  


 


 06/26/22 12:42   81    13  














  Pulse Ox


 


 06/27/22 12:00  98


 


 06/27/22 11:42 


 


 06/27/22 11:41 


 


 06/27/22 11:00  99


 


 06/27/22 10:00  92


 


 06/27/22 09:05  98


 


 06/27/22 09:00  100


 


 06/27/22 08:50 


 


 06/27/22 08:00  99


 


 06/27/22 07:00  96


 


 06/27/22 06:00  98


 


 06/27/22 05:00  95


 


 06/27/22 04:42  100


 


 06/27/22 04:20 


 


 06/27/22 04:00 


 


 06/27/22 03:58  99


 


 06/27/22 03:57 


 


 06/27/22 03:38  99


 


 06/27/22 03:00  98


 


 06/27/22 02:00  96


 


 06/27/22 01:00 


 


 06/27/22 00:09 


 


 06/27/22 00:06  99


 


 06/27/22 00:00  99


 


 06/26/22 23:00  91


 


 06/26/22 22:00  96


 


 06/26/22 21:50  99


 


 06/26/22 21:00  98


 


 06/26/22 20:32 


 


 06/26/22 20:17  99


 


 06/26/22 20:00  85


 


 06/26/22 19:52  99


 


 06/26/22 19:00  97


 


 06/26/22 18:20  99


 


 06/26/22 18:10  99


 


 06/26/22 18:00  100


 


 06/26/22 17:50  99


 


 06/26/22 17:40  100


 


 06/26/22 17:30  100


 


 06/26/22 17:20  100


 


 06/26/22 17:10  99


 


 06/26/22 17:00  95


 


 06/26/22 16:50  97


 


 06/26/22 16:40  92


 


 06/26/22 16:30  98


 


 06/26/22 16:20  97


 


 06/26/22 16:10  99


 


 06/26/22 16:00  97


 


 06/26/22 15:50  100


 


 06/26/22 15:40  100


 


 06/26/22 15:30  100


 


 06/26/22 15:20  100


 


 06/26/22 15:10  100


 


 06/26/22 15:00  100


 


 06/26/22 14:50  100


 


 06/26/22 14:40  100


 


 06/26/22 14:30  100


 


 06/26/22 14:20  100


 


 06/26/22 14:10  100


 


 06/26/22 14:00  99


 


 06/26/22 13:50  83 L


 


 06/26/22 13:40  95


 


 06/26/22 13:30  98


 


 06/26/22 13:20  86


 


 06/26/22 13:10  98


 


 06/26/22 13:00  99


 


 06/26/22 12:50  95


 


 06/26/22 12:42  100














- Physical Examination


General: No Apparent Distress, Other (On CPAP)


HEENT: Positive: PERRL


Neck: Positive: neck supple


Cardiac: Positive: Irregularly Regular


Lungs: Positive: Decreased Breath Sounds


Neuro: Positive: Grossly Intact


Abdomen: Positive: Soft, Active Bowel Sounds


Skin: Negative: Rash


Extremities: Present: edema (Minimal)





- Labs and Meds


                                       CBC











  06/27/22 Range/Units





  04:28 


 


WBC  6.3  (4.5-11.0)  K/mm3


 


RBC  3.83  (3.65-5.03)  M/mm3


 


Hgb  10.1  (10.1-14.3)  gm/dl


 


Hct  33.8  (30.3-42.9)  %


 


Plt Count  121 L  (140-440)  K/mm3








                          Comprehensive Metabolic Panel











  06/27/22 Range/Units





  04:28 


 


Sodium  140  (137-145)  mmol/L


 


Potassium  3.5 L  (3.6-5.0)  mmol/L


 


Chloride  91.4 L  ()  mmol/L


 


Carbon Dioxide  43 H*  (22-30)  mmol/L


 


BUN  10  (7-17)  mg/dL


 


Creatinine  0.4 L  (0.6-1.2)  mg/dL


 


Glucose  89  ()  mg/dL


 


Calcium  8.5  (8.4-10.2)  mg/dL

## 2022-06-27 NOTE — PROGRESS NOTE
Assessment and Plan








Acute and chronic hypoxemic respiratory failure 


Pleural Effusion (Recurrent)


Indwelling Pleurx catheter (R. Pleural space)


Acute exacerbation of CHF


Pulmonary embolism


AE-COPD


Hypertension


Obesity


Sleep apnea in adult


Obesity with alveolar hypoventilation





- CHF appears to be major cause of symptoms (Pleur'x catheter was apparently 

placed re: recurrent effusions); will taper off systemic steroids


- continue ICS


- she is at baseline home O2 levels now but with increased WOB; if no further 

improvement will drain pleural effusion via Pleurx


- continue diuresis


- continue NIV scheduled qhs with prn daytime use


- aspiration precautions


- continue to wean supplemental oxygen for target O2 sat's > 90% acutely


- bronchodilators (DEANNE & LABA) with pulmonary hygiene per RT


- continue accuchecks with glycemic control per SSI (While critically ill target

blood glucose of 140-180 mg/dL; avoid hypoglycemia)


- avoid nephrotoxins, renally dose all medications


- avoid benzodiazepine's, reduce the possibility of delirium


- follow clinically off AB's 


- prn analgesia per CPOT score


- Maintenance of sleep-wake cycle, avoid delirium


- G.I. & VTE prophylaxis


- PT/OT/ROM exercises


- continue mobility protocols for pressure ulcer prophylaxis


- Monitor hemodynamics closely


- continue other care per attending / other consultants


- discharge planning ongoing concurrently





.... Re-evaluate in am & prn





CONDITION: CRITICAL


PROGNOSIS: GUARDED


CODE STATUS: FULL CODE





The high probability of a clinically significant, sudden or life-threatening 

deterioration of the [respiratory & cardiovascular] system(s) required my full 

and direct attention, intervention and personal management. The aggregate 

critical care time was [34] minutes without overlap. Time includes spent on;





[x] Data Review and interpretation 





[x] Patient assessment and monitoring of vital signs 





[x] Documentation 





[x] Medication orders and management

















Subjective


Date of service: 06/27/22


Principal diagnosis: AHRF; R. Pleural Effusion Indwelling Pleurx catheter; AE-

CHF; P.E.; AE-COPD


Interval history: 





Patient is seen today for: Ac. hypoxemic respiratory failure; Pleural Effusion 

(Recurrent); Indwelling Pleurx catheter (R. Pleural space); AE-CHF; P.E.; AE-

COPD; HTN; KRISTIE / OHS





Seen and examined at bedside; 24hour events reviewed; nursing and respiratory 

care staff consulted; no adverse overnight events reported to me; resting 

peacefully in bed; feels better overall but still with labored breathing and 

requiring daytime BIPAP use; she is close to home oxygen baseline; states she 

has never had to use her Pleurx catheter at home yet; denies acute chest pain; 

denies N/V/F/C; never smoker





Objective


                               Vital Signs - 12hr











  06/27/22 06/27/22 06/27/22





  02:00 03:00 03:38


 


Temperature   


 


Pulse Rate 85 78 


 


Pulse Rate [   





Bilateral   





Throughout]   


 


Respiratory 10 L 12 





Rate   


 


Respiratory   





Rate [Bilateral   





Throughout]   


 


Blood Pressure 91/52 89/54 


 


O2 Sat by Pulse 96 98 99





Oximetry   














  06/27/22 06/27/22 06/27/22





  03:57 03:58 04:00


 


Temperature   


 


Pulse Rate  76 87


 


Pulse Rate [ 80  





Bilateral   





Throughout]   


 


Respiratory  14 18





Rate   


 


Respiratory 12  





Rate [Bilateral   





Throughout]   


 


Blood Pressure   105/72


 


O2 Sat by Pulse  99 





Oximetry   














  06/27/22 06/27/22 06/27/22





  04:20 04:42 05:00


 


Temperature 97.8 F  


 


Pulse Rate   96 H


 


Pulse Rate [   





Bilateral   





Throughout]   


 


Respiratory   21





Rate   


 


Respiratory   





Rate [Bilateral   





Throughout]   


 


Blood Pressure   105/69


 


O2 Sat by Pulse  100 95





Oximetry   














  06/27/22 06/27/22 06/27/22





  06:00 07:00 08:00


 


Temperature   97.8 F


 


Pulse Rate 84 105 H 96 H


 


Pulse Rate [   





Bilateral   





Throughout]   


 


Respiratory 22 21 15





Rate   


 


Respiratory   





Rate [Bilateral   





Throughout]   


 


Blood Pressure 113/67 120/77 114/66


 


O2 Sat by Pulse 98 96 99





Oximetry   














  06/27/22 06/27/22 06/27/22





  08:50 09:00 09:05


 


Temperature   


 


Pulse Rate  95 H 


 


Pulse Rate [ 92 H  





Bilateral   





Throughout]   


 


Respiratory  18 





Rate   


 


Respiratory 18  





Rate [Bilateral   





Throughout]   


 


Blood Pressure  119/69 


 


O2 Sat by Pulse  100 98





Oximetry   














  06/27/22 06/27/22 06/27/22





  10:00 11:00 11:41


 


Temperature   97 F L


 


Pulse Rate 104 H 86 


 


Pulse Rate [   





Bilateral   





Throughout]   


 


Respiratory 21 16 





Rate   


 


Respiratory   





Rate [Bilateral   





Throughout]   


 


Blood Pressure 113/81 103/54 


 


O2 Sat by Pulse 92 99 





Oximetry   














  06/27/22 06/27/22 06/27/22





  11:42 12:00 13:00


 


Temperature   


 


Pulse Rate 93 H 92 H 104 H


 


Pulse Rate [   





Bilateral   





Throughout]   


 


Respiratory  18 18





Rate   


 


Respiratory   





Rate [Bilateral   





Throughout]   


 


Blood Pressure  99/71 99/71


 


O2 Sat by Pulse  98 80 L





Oximetry   











Constitutional: no acute distress, alert, other (elderly obese female with 

moderately increased respiratory effort at rest)


Eyes: non-icteric


ENT: oropharynx moist


Neck: supple, no JVD


Effort: mildly labored


Ascultation: Bilateral: rales, other (R. Pleurx catheter)


Percussion: Right: dull (base)


Cardiovascular: regular rate and rhythm


Gastrointestinal: normoactive bowel sounds, soft, non-tender, non-distended 

(protuberant)


Integumentary: normal


Extremities: no cyanosis, no edema, no ischemia or petechiae, edema


Neurologic: normal mental status, non-focal exam, pupils equal and round, motor 

strength normal and


Psychiatric: mood appropriate, affect normal


CBC and BMP: 


                                 06/27/22 04:28





                                 06/27/22 04:28


ABG, PT/INR, D-dimer: 


ABG











ABG pH  7.412 pH Units (7.350-7.450)   06/25/22  23:40    


 


ABG pCO2  72.6 mm Hg  06/25/22  23:40    


 


ABG pO2  73.9 mm Hg (80.0-90.0)  L  06/25/22  23:40    


 


ABG O2 Saturation  97.3 % (95.0-99.0)   06/25/22  23:40    





PT/INR, D-dimer











D-Dimer  1852.16 ng/mlDDU (0-234)  H  06/25/22  23:41    








Abnormal lab findings: 


                                  Abnormal Labs











  06/25/22 06/25/22 06/25/22





  23:40 23:41 23:41


 


MCH   27 L 


 


RDW   16.4 H 


 


Plt Count   


 


D-Dimer    1852.16 H


 


ABG pO2  73.9 L  


 


ABG HCO3  45.2 H  


 


ABG Base Excess  17.8 H  


 


ABG Hemoglobin  10.0 L  


 


Potassium   


 


Chloride   


 


Carbon Dioxide   


 


Creatinine   


 


Glucose   


 


NT-Pro-B Natriuret Pep   


 


Total Protein   


 


Albumin   














  06/25/22 06/27/22 06/27/22





  23:41 04:28 04:28


 


MCH   26 L 


 


RDW   16.8 H 


 


Plt Count   121 L 


 


D-Dimer   


 


ABG pO2   


 


ABG HCO3   


 


ABG Base Excess   


 


ABG Hemoglobin   


 


Potassium    3.5 L


 


Chloride  92.8 L   91.4 L


 


Carbon Dioxide  39 H   43 H*


 


Creatinine    0.4 L


 


Glucose  191 H  


 


NT-Pro-B Natriuret Pep  2950 H  


 


Total Protein  5.6 L  


 


Albumin  3.7 L  











CT scan - chest: image reviewed (R>L pleural effusions; no gross PE; await 

official read)


Allied health notes reviewed: nursing

## 2022-06-27 NOTE — VASCULAR LAB REPORT
DUPLEX DOPPLER LOWER EXTREMITY VEINS, BILATERAL



INDICATION:  dvt.



TECHNIQUE:  Duplex doppler imaging was performed through the veins of both lower extremities using ve
nous compression and other maneuvers.



COMPARISON:  No relevant prior imaging study available.



FINDINGS:

Right Common femoral vein: Negative.

Right Superficial femoral vein: Negative.

Right Popliteal vein: Negative.

Right Calf veins: Negative.



Left Common femoral vein: Negative.

Left Superficial femoral vein: Negative.

Left Popliteal vein: Negative.

Left Calf veins: Negative.



Additional findings: Moderate bilateral popliteal cysts are identified.



IMPRESSION:

 No sonographic evidence for DVT in either lower extremity. Bilateral popliteal cysts.



Signer Name: Ritchie Angulo Jr, MD 

Signed: 6/27/2022 2:00 PM

Workstation Name: [id number]

## 2022-06-27 NOTE — ELECTROCARDIOGRAPH REPORT
[facility]

                                       

Test Date:    2022               Test Time:    03:19:51

Pat Name:     GÓMEZ MONGE             Department:   

Patient ID:   [id number]          Room:         A267 1

Gender:       F                        Technician:   LOI

:          1945               Requested By: ALBA JENKINS

Order Number: [id number]              Reading MD:   Jose Roberto Sethi

                                 Measurements

Intervals                              Axis          

Rate:         74                       P:            

WY:                                    QRS:          61

QRSD:         88                       T:            70

QT:           385                                    

QTc:          428                                    

                           Interpretive Statements

Atrial fibrillation

Low voltage, extremity and precordial leads

Compared to ECG 2022 01:54:03

No significant change

Electronically Signed On 2022 10:43:47 EDT by Jose Roberto Sethi

## 2022-06-27 NOTE — PROGRESS NOTE
Assessment and Plan


Assessment and plan: 


77-year-old white female with known history of CHF, COPD, coronary artery 

disease and hyperlipidemia presents to the emergency room via EMS today for 

shortness of breath.  Oxygen saturation was about 85% upon arrival EMS and 

patient was subsequently placed on CPAP in route to the hospital.


Upon arrival in the emergency room patient was switched to BiPAP.


She denies any fever or chills, no chest pain, no nausea vomiting, no abdominal 

pain.  Denies any headache or dizziness and denies any diaphoresis.





Daughter was by the bedside indicates that patient was just discharged from 

[facility] few days ago after being admitted for pleural effusion and CHF 

exacerbation.  Patient thoracentesis done while at [facility].


Patient was also placed on a course of oral antibiotics after discharge from 

[facility].





Work-up in the ER today, chest x-ray shows no significant change.





CT angiogram of the chest shows pulmonary emboli within the subsegmental 

branches of the left upper lobe, small to moderate right pleural effusion with 

adjacent atelectasis and indwelling catheter, cardiomegaly small amount of 

perihepatic and perisplenic fluid.


Lab reveals BNP of 2950 D-dimer 1852.





Patient has been commenced on anticoagulation and diuretics.











1.  Acute respiratory failurepossibly secondary to CHF/COPD/pulmonary embolism





2.  Acute on chronic diastolic CHF exacerbation





3.  Pulmonary emboli





4.  Hypertension





5.  COPD with acute exacerbation





6.  Chronic atrial fibrillation





7.  Recurrent pleural effusion status post right-sided indwelling Pleurx cath

eter





8.  Morbid obesity with obesity related hypoventilation





Plan:





1.  Patient admitted and placed on anticoagulation with Lovenox, will continue 

[facility] care at this time





2.  Patient placed on diuretics.  Will monitor inputs and outputs and also 

monitor daily weight patient





3.  Patient follows up with cardiologist Patient follows up with CHARLOTTE Callahan





4.  Echocardiogram reviewed shows preserved ejection fraction





5.  Discussed extensively with the patient she reports that she was evaluated 

for malignancy on fluid analysis and all came back negative.  She is on home 

oxygen anticipate discharge home back on home oxygen.  If patient continues to 

show improvement can likely be discharged in 24 to 48 hours





6.  Advance care planning discussion had for 30 minutes along with patient's 

daughter.  She remains full code





DVT Prophylaxis: Currently on subcutaneous Lovenox





Code Status: Full Code.











History


Interval history: 


Patient seen and examined this morning while she reports improvement she reports

that she is not at her baseline.  She did sleep with the BiPAP last night and 

was taken off around 5 AM.








Hospitalist Physical





- Physical exam


Narrative exam: 


VITAL SIGNS:  Reviewed.    


GENERAL:  The patient appears normally developed, chronically ill-appearing 

mildly respiratory distress vital signs as documented.


HEAD:  No signs of head trauma.


EYES:  Pupils are equal.  Extraocular motions intact.  


EARS:  Hearing grossly intact.


MOUTH:  Oropharynx is normal. 


NECK:  No adenopathy, no JVD.  


CHEST: Right chest wall Pleure-vac catheter chest with crackles breath sounds 

bilaterally.  No wheezes,.  


CARDIAC: Irregularly irregular rate and rhythm.  S1 and S2, without murmurs, 

gallops, or rubs.


VASCULAR: 3+ edema.  Peripheral pulses normal and equal in all extremities.


ABDOMEN:  Soft, non tender and non distended.  No   rebound or guarding, and no 

masses palpated.   Bowel Sounds normal.


MUSCULOSKELETAL:  Good range of motion of all major joints. Extremities without 

clubbing, cyanosis.  3+  edema.  


NEUROLOGIC EXAM:  Alert and oriented x 3   No focal sensory or strength 

deficits.   Speech normal.  Follows commands.


PSYCHIATRIC:  Mood normal.


SKIN:  detail exam as documented in skin assessment











- Constitutional


Vitals: 


                                        











Temp Pulse Resp BP Pulse Ox


 


 97.8 F   96 H  19   114/66   99 


 


 06/27/22 08:00  06/27/22 08:00  06/27/22 08:00  06/27/22 08:00  06/27/22 08:00











General appearance: Present: no acute distress, obese





Results





- Labs


CBC & Chem 7: 


                                 06/27/22 04:28





                                 06/27/22 04:28


Labs: 


                             Laboratory Last Values











WBC  6.3 K/mm3 (4.5-11.0)   06/27/22  04:28    


 


RBC  3.83 M/mm3 (3.65-5.03)   06/27/22  04:28    


 


Hgb  10.1 gm/dl (10.1-14.3)   06/27/22  04:28    


 


Hct  33.8 % (30.3-42.9)   06/27/22  04:28    


 


MCV  88 fl (79-97)   06/27/22  04:28    


 


MCH  26 pg (28-32)  L  06/27/22  04:28    


 


MCHC  30 % (30-34)   06/27/22  04:28    


 


RDW  16.8 % (13.2-15.2)  H  06/27/22  04:28    


 


Plt Count  121 K/mm3 (140-440)  L  06/27/22  04:28    


 


D-Dimer  1852.16 ng/mlDDU (0-234)  H  06/25/22  23:41    


 


ABG pH  7.412 pH Units (7.350-7.450)   06/25/22  23:40    


 


ABG pCO2  72.6 mm Hg  06/25/22  23:40    


 


ABG pO2  73.9 mm Hg (80.0-90.0)  L  06/25/22  23:40    


 


ABG HCO3  45.2 mmol/L (20.0-26.0)  H  06/25/22  23:40    


 


ABG O2 Saturation  97.3 % (95.0-99.0)   06/25/22  23:40    


 


ABG O2 Content  13.5  (0.0-44)   06/25/22  23:40    


 


ABG Base Excess  17.8 mmol/L (-2.0-3.0)  H  06/25/22  23:40    


 


ABG Hemoglobin  10.0 gm/dl (12.0-16.0)  L  06/25/22  23:40    


 


ABG Carboxyhemoglobin  1.6 % (0.0-5.0)   06/25/22  23:40    


 


ABG Methemoglobin  0.3 % (0.0-1.5)   06/25/22  23:40    


 


Oxyhemoglobin  95.5 % (95.0-99.0)   06/25/22  23:40    


 


FiO2  35 %  06/25/22  23:40    


 


Sodium  140 mmol/L (137-145)   06/27/22  04:28    


 


Potassium  3.5 mmol/L (3.6-5.0)  L  06/27/22  04:28    


 


Chloride  91.4 mmol/L ()  L  06/27/22  04:28    


 


Carbon Dioxide  43 mmol/L (22-30)  H*  06/27/22  04:28    


 


Anion Gap  9 mmol/L  06/27/22  04:28    


 


BUN  10 mg/dL (7-17)   06/27/22  04:28    


 


Creatinine  0.4 mg/dL (0.6-1.2)  L  06/27/22  04:28    


 


Estimated GFR  > 60 ml/min  06/27/22  04:28    


 


BUN/Creatinine Ratio  25 %  06/27/22  04:28    


 


Glucose  89 mg/dL ()   06/27/22  04:28    


 


Calcium  8.5 mg/dL (8.4-10.2)   06/27/22  04:28    


 


Magnesium  2.00 mg/dL (1.7-2.3)   06/25/22  23:41    


 


Total Bilirubin  0.40 mg/dL (0.1-1.2)   06/25/22  23:41    


 


AST  28 units/L (5-40)   06/25/22  23:41    


 


ALT  33 units/L (7-56)   06/25/22  23:41    


 


Alkaline Phosphatase  95 units/L ()   06/25/22  23:41    


 


NT-Pro-B Natriuret Pep  2950 pg/mL (0-900)  H  06/25/22  23:41    


 


Total Protein  5.6 g/dL (6.3-8.2)  L  06/25/22  23:41    


 


Albumin  3.7 g/dL (3.9-5)  L  06/25/22  23:41    


 


Albumin/Globulin Ratio  1.9 %  06/25/22  23:41    


 


Urine Color  Yellow  (Yellow)   06/26/22  10:34    


 


Urine Turbidity  Clear  (Clear)   06/26/22  10:34    


 


Urine pH  7.0  (5.0-7.0)   06/26/22  10:34    


 


Ur Specific Gravity  1.024  (1.003-1.030)   06/26/22  10:34    


 


Urine Protein  <15 mg/dl mg/dL (Negative)   06/26/22  10:34    


 


Urine Glucose (UA)  >=500 mg/dL (Negative)   06/26/22  10:34    


 


Urine Ketones  Neg mg/dL (Negative)   06/26/22  10:34    


 


Urine Blood  Neg  (Negative)   06/26/22  10:34    


 


Urine Nitrite  Neg  (Negative)   06/26/22  10:34    


 


Urine Bilirubin  Neg  (Negative)   06/26/22  10:34    


 


Urine Urobilinogen  < 2.0 mg/dL (<2.0)   06/26/22  10:34    


 


Ur Leukocyte Esterase  Sm  (Negative)   06/26/22  10:34    


 


Urine WBC (Auto)  2.0 /HPF (0.0-6.0)   06/26/22  10:34    


 


Urine RBC (Auto)  1.0 /HPF (0.0-6.0)   06/26/22  10:34    


 


U Epithel Cells (Auto)  < 1.0 /HPF (0-13.0)   06/26/22  10:34    


 


Coronavirus (PCR)  Negative  (Negative)   06/26/22  Unknown











Loza/IV: 


                                        





Voiding Method                   External Female Catheter











Active Medications





- Current Medications


Current Medications: 














Generic Name Dose Route Start Last Admin





  Trade Name Freq  PRN Reason Stop Dose Admin


 


Acetaminophen  650 mg  06/26/22 05:20 





  Acetaminophen 325 Mg Tab  PO  





  Q4H PRN  





  Pain MILD(1-3)/Fever >100.5/HA  


 


Albuterol  2.5 mg  06/26/22 20:00  06/27/22 03:57





  Albuterol 2.5 Mg/3 Ml Nebu  IH   2.5 mg





  Q6HRT BRANDY   Administration


 


Enoxaparin Sodium  100 mg  06/26/22 10:00  06/26/22 21:09





  Enoxaparin 100 Mg/1 Ml Inj  1 mg/kg (100 mg)   100 mg





  SUB-Q   Administration





  Q12HR [facility]  





  Protocol  


 


Furosemide  40 mg  06/26/22 06:00  06/27/22 05:30





  Furosemide 40 Mg/4 Ml Inj  IV   40 mg





  BID@0600,1800 BRANDY   Administration


 


Magnesium Hydroxide  30 ml  06/26/22 05:20 





  Magnesium Hydroxide (Mom) Oral Liqd Udc  PO  





  Q4H PRN  





  Constipation  


 


Morphine Sulfate  2 mg  06/26/22 05:20 





  Morphine 2 Mg/1 Ml Inj  IV  





  Q4H PRN  





  Pain, Moderate (4-6)  


 


Morphine Sulfate  4 mg  06/26/22 05:20 





  Morphine 4 Mg/1 Ml Inj  IV  





  Q4H PRN  





  Pain , Severe (7-10)  


 


Ondansetron HCl  4 mg  06/26/22 05:20 





  Ondansetron 4 Mg/2 Ml Inj  IV  





  Q8H PRN  





  Nausea And Vomiting  


 


Sodium Chloride  10 ml  06/26/22 10:00  06/26/22 21:09





  Sodium Chloride 0.9% 10 Ml Flush Syringe  IV   10 ml





  BID BRANDY   Administration


 


Sodium Chloride  10 ml  06/26/22 05:20 





  Sodium Chloride 0.9% 10 Ml Flush Syringe  IV  





  PRN PRN  





  LINE FLUSH  


 


Zolpidem Tartrate  10 mg  06/26/22 20:30  06/26/22 21:09





  Zolpidem 5 Mg Tab  PO   10 mg





  QHS PRN   Administration





  Sleep

## 2022-06-28 LAB
APTT BLD: 30.4 SEC. (ref 24.2–36.6)
BUN SERPL-MCNC: 13 MG/DL (ref 7–17)
BUN/CREAT SERPL: 26 %
CALCIUM SERPL-MCNC: 8.7 MG/DL (ref 8.4–10.2)
HCT VFR BLD CALC: 33.1 % (ref 30.3–42.9)
HCT VFR BLD CALC: 34.1 % (ref 30.3–42.9)
HEMOLYSIS INDEX: 2
HGB BLD-MCNC: 10.4 GM/DL (ref 10.1–14.3)
HGB BLD-MCNC: 11 GM/DL (ref 10.1–14.3)
INR PPP: 1.03 (ref 0.87–1.13)
MCHC RBC AUTO-ENTMCNC: 31 % (ref 30–34)
MCHC RBC AUTO-ENTMCNC: 32 % (ref 30–34)
MCV RBC AUTO: 86 FL (ref 79–97)
MCV RBC AUTO: 86 FL (ref 79–97)
PLATELET # BLD: 139 K/MM3 (ref 140–440)
PLATELET # BLD: 165 K/MM3 (ref 140–440)
RBC # BLD AUTO: 3.87 M/MM3 (ref 3.65–5.03)
RBC # BLD AUTO: 3.98 M/MM3 (ref 3.65–5.03)

## 2022-06-28 RX ADMIN — ALBUTEROL SULFATE SCH MG: 2.5 SOLUTION RESPIRATORY (INHALATION) at 17:40

## 2022-06-28 RX ADMIN — Medication SCH ML: at 09:27

## 2022-06-28 RX ADMIN — ENOXAPARIN SODIUM SCH MG: 100 INJECTION SUBCUTANEOUS at 09:27

## 2022-06-28 RX ADMIN — Medication SCH ML: at 21:24

## 2022-06-28 RX ADMIN — BENZONATATE SCH: 100 CAPSULE ORAL at 05:09

## 2022-06-28 RX ADMIN — ZOLPIDEM TARTRATE SCH MG: 5 TABLET ORAL at 21:24

## 2022-06-28 RX ADMIN — BUDESONIDE SCH MG: 0.5 INHALANT RESPIRATORY (INHALATION) at 09:48

## 2022-06-28 RX ADMIN — ARFORMOTEROL TARTRATE SCH MCG: 15 SOLUTION RESPIRATORY (INHALATION) at 09:48

## 2022-06-28 RX ADMIN — FUROSEMIDE SCH MG: 10 INJECTION, SOLUTION INTRAVENOUS at 17:34

## 2022-06-28 RX ADMIN — SERTRALINE SCH MG: 100 TABLET, FILM COATED ORAL at 09:27

## 2022-06-28 RX ADMIN — ASPIRIN SCH MG: 81 TABLET, COATED ORAL at 09:27

## 2022-06-28 RX ADMIN — ALBUTEROL SULFATE SCH MG: 2.5 SOLUTION RESPIRATORY (INHALATION) at 09:48

## 2022-06-28 RX ADMIN — METHYLPREDNISOLONE SODIUM SUCCINATE SCH MG: 40 INJECTION, POWDER, FOR SOLUTION INTRAMUSCULAR; INTRAVENOUS at 17:33

## 2022-06-28 RX ADMIN — FUROSEMIDE SCH MG: 10 INJECTION, SOLUTION INTRAVENOUS at 05:02

## 2022-06-28 RX ADMIN — ALBUTEROL SULFATE SCH: 2.5 SOLUTION RESPIRATORY (INHALATION) at 13:43

## 2022-06-28 RX ADMIN — BENZONATATE SCH MG: 100 CAPSULE ORAL at 14:00

## 2022-06-28 RX ADMIN — BENZONATATE SCH MG: 100 CAPSULE ORAL at 21:24

## 2022-06-28 RX ADMIN — METHYLPREDNISOLONE SODIUM SUCCINATE SCH MG: 40 INJECTION, POWDER, FOR SOLUTION INTRAMUSCULAR; INTRAVENOUS at 04:11

## 2022-06-28 NOTE — PROGRESS NOTE
Assessment and Plan


Assessment and plan: 





77-year-old white female with known history of CHF, COPD, coronary artery 

disease and hyperlipidemia presents to the emergency room via EMS today for 

shortness of breath.  Oxygen saturation was about 85% upon arrival EMS and 

patient was subsequently placed on CPAP in route to the hospital.


Upon arrival in the emergency room patient was switched to BiPAP.


She denies any fever or chills, no chest pain, no nausea vomiting, no abdominal 

pain.  Denies any headache or dizziness and denies any diaphoresis.





Daughter was by the bedside indicates that patient was just discharged from 

[facility] few days ago after being admitted for pleural effusion and CHF 

exacerbation.  Patient thoracentesis done while at [facility].


Patient was also placed on a course of oral antibiotics after discharge from 

[facility].





Work-up in the ER today, chest x-ray shows no significant change.





CT angiogram of the chest shows pulmonary emboli within the subsegmental 

branches of the left upper lobe, small to moderate right pleural effusion with 

adjacent atelectasis and indwelling catheter, cardiomegaly small amount of 

perihepatic and perisplenic fluid.


Lab reveals BNP of 2950 D-dimer 1852.





Patient has been commenced on anticoagulation and diuretics.











1.  Acute respiratory failurepossibly secondary to CHF/COPD/pulmonary embolism





2.  Acute on chronic diastolic CHF exacerbation





3.  Pulmonary emboli





4.  Hypertension





5.  COPD with acute exacerbation





6.  Chronic atrial fibrillation





7.  Recurrent pleural effusion status post right-sided indwelling Pleurx 

catheter





8.  Morbid obesity with obesity related hypoventilation





Plan:





1.  Anticoagulation transition to Xarelto.





2.  Patient placed on diuretics.  Will monitor inputs and outputs and also 

monitor daily weight patient





3.  Discussed case with Dr. Sethi.  Christian to DC amiodarone.  Restart rate control

medication, started diltiazem at home dose.





4.  Echocardiogram reviewed shows preserved ejection fraction





5.  Discussed extensively with the patient she reports that she was evaluated f

or malignancy on fluid analysis and all came back negative.  She is on home 

oxygen anticipate discharge home back on home oxygen.  Still complaining of some

shortness of breath but improved overall. If patient continues to show 

improvement can likely be discharged in 24 to 48 hours





6.  Advance care planning discussion had for 30 minutes.  She remains full code





DVT Prophylaxis: Currently on subcutaneous Lovenox





Code Status: Full Code.





The high probability of a clinically significant, sudden or life threatening 

deterioration of the [multi] system(s) required my full and direct attention, 

intervention and personal management. The aggregate critical care time was [60] 

minutes. This time is in addition to time spent performing reported procedures 

but includes the following: 





[x] Data Review and interpretation 





[x] Patient assessment and monitoring of vital signs 





[x] Documentation 





[x] Medication orders and management





History


Interval history: 





Patient continues to complain of shortness of breath however she states that she

feels improved compared to yesterday.





Hospitalist Physical





- Physical exam


Narrative exam: 





VITAL SIGNS:  Reviewed.    


GENERAL:  The patient appears normally developed, chronically ill-appearing 

mildly respiratory distress vital signs as documented.


HEAD:  No signs of head trauma.


EYES:  Pupils are equal.  Extraocular motions intact.  


EARS:  Hearing grossly intact.


MOUTH:  Oropharynx is normal. 


NECK:  No adenopathy, no JVD.  


CHEST: Right chest wall Pleure-vac catheter chest with crackles breath sounds 

bilaterally.  No wheezes,.  


CARDIAC: Irregularly irregular rate and rhythm.  S1 and S2, without murmurs, 

gallops, or rubs.


VASCULAR: 3+ edema.  Peripheral pulses normal and equal in all extremities.


ABDOMEN:  Soft, non tender and non distended.  No   rebound or guarding, and no 

masses palpated.   Bowel Sounds normal.


MUSCULOSKELETAL:  Good range of motion of all major joints. Extremities without 

clubbing, cyanosis.  3+  edema.  


NEUROLOGIC EXAM:  Alert and oriented x 3   No focal sensory or strength 

deficits.   Speech normal.  Follows commands.


PSYCHIATRIC:  Mood normal.


SKIN:  detail exam as documented in skin assessment





- Constitutional


Vitals: 


                                        











Temp Pulse Resp BP Pulse Ox


 


 99.2 F   124 H  46 H  110/64   97 


 


 06/28/22 12:07  06/28/22 12:00  06/28/22 12:00  06/28/22 12:00  06/28/22 12:00











General appearance: Present: no acute distress, obese





Results





- Labs


CBC & Chem 7: 


                                 06/28/22 04:21





                                 06/28/22 04:21


Labs: 


                             Laboratory Last Values











WBC  3.4 K/mm3 (4.5-11.0)  L  06/28/22  04:21    


 


RBC  3.87 M/mm3 (3.65-5.03)   06/28/22  04:21    


 


Hgb  10.4 gm/dl (10.1-14.3)   06/28/22  04:21    


 


Hct  33.1 % (30.3-42.9)   06/28/22  04:21    


 


MCV  86 fl (79-97)   06/28/22  04:21    


 


MCH  27 pg (28-32)  L  06/28/22  04:21    


 


MCHC  31 % (30-34)   06/28/22  04:21    


 


RDW  16.7 % (13.2-15.2)  H  06/28/22  04:21    


 


Plt Count  139 K/mm3 (140-440)  L  06/28/22  04:21    


 


D-Dimer  1852.16 ng/mlDDU (0-234)  H  06/25/22  23:41    


 


ABG pH  7.412 pH Units (7.350-7.450)   06/25/22  23:40    


 


ABG pCO2  72.6 mm Hg  06/25/22  23:40    


 


ABG pO2  73.9 mm Hg (80.0-90.0)  L  06/25/22  23:40    


 


ABG HCO3  45.2 mmol/L (20.0-26.0)  H  06/25/22  23:40    


 


ABG O2 Saturation  97.3 % (95.0-99.0)   06/25/22  23:40    


 


ABG O2 Content  13.5  (0.0-44)   06/25/22  23:40    


 


ABG Base Excess  17.8 mmol/L (-2.0-3.0)  H  06/25/22  23:40    


 


ABG Hemoglobin  10.0 gm/dl (12.0-16.0)  L  06/25/22  23:40    


 


ABG Carboxyhemoglobin  1.6 % (0.0-5.0)   06/25/22  23:40    


 


ABG Methemoglobin  0.3 % (0.0-1.5)   06/25/22  23:40    


 


Oxyhemoglobin  95.5 % (95.0-99.0)   06/25/22  23:40    


 


FiO2  35 %  06/25/22  23:40    


 


Sodium  142 mmol/L (137-145)   06/28/22  04:21    


 


Potassium  3.5 mmol/L (3.6-5.0)  L  06/28/22  04:21    


 


Chloride  91.4 mmol/L ()  L  06/28/22  04:21    


 


Carbon Dioxide  40 mmol/L (22-30)  H  06/28/22  04:21    


 


Anion Gap  14 mmol/L  06/28/22  04:21    


 


BUN  13 mg/dL (7-17)   06/28/22  04:21    


 


Creatinine  0.5 mg/dL (0.6-1.2)  L  06/28/22  04:21    


 


Estimated GFR  > 60 ml/min  06/28/22  04:21    


 


BUN/Creatinine Ratio  26 %  06/28/22  04:21    


 


Glucose  139 mg/dL ()  H  06/28/22  04:21    


 


Calcium  8.7 mg/dL (8.4-10.2)   06/28/22  04:21    


 


Magnesium  2.00 mg/dL (1.7-2.3)   06/25/22  23:41    


 


Total Bilirubin  0.40 mg/dL (0.1-1.2)   06/25/22  23:41    


 


AST  28 units/L (5-40)   06/25/22  23:41    


 


ALT  33 units/L (7-56)   06/25/22  23:41    


 


Alkaline Phosphatase  95 units/L ()   06/25/22  23:41    


 


NT-Pro-B Natriuret Pep  2950 pg/mL (0-900)  H  06/25/22  23:41    


 


Total Protein  5.6 g/dL (6.3-8.2)  L  06/25/22  23:41    


 


Albumin  3.7 g/dL (3.9-5)  L  06/25/22  23:41    


 


Albumin/Globulin Ratio  1.9 %  06/25/22  23:41    


 


Urine Color  Yellow  (Yellow)   06/26/22  10:34    


 


Urine Turbidity  Clear  (Clear)   06/26/22  10:34    


 


Urine pH  7.0  (5.0-7.0)   06/26/22  10:34    


 


Ur Specific Gravity  1.024  (1.003-1.030)   06/26/22  10:34    


 


Urine Protein  <15 mg/dl mg/dL (Negative)   06/26/22  10:34    


 


Urine Glucose (UA)  >=500 mg/dL (Negative)   06/26/22  10:34    


 


Urine Ketones  Neg mg/dL (Negative)   06/26/22  10:34    


 


Urine Blood  Neg  (Negative)   06/26/22  10:34    


 


Urine Nitrite  Neg  (Negative)   06/26/22  10:34    


 


Urine Bilirubin  Neg  (Negative)   06/26/22  10:34    


 


Urine Urobilinogen  < 2.0 mg/dL (<2.0)   06/26/22  10:34    


 


Ur Leukocyte Esterase  Sm  (Negative)   06/26/22  10:34    


 


Urine WBC (Auto)  2.0 /HPF (0.0-6.0)   06/26/22  10:34    


 


Urine RBC (Auto)  1.0 /HPF (0.0-6.0)   06/26/22  10:34    


 


U Epithel Cells (Auto)  < 1.0 /HPF (0-13.0)   06/26/22  10:34    


 


Coronavirus (PCR)  Negative  (Negative)   06/26/22  Unknown











Loza/IV: 


                                        





Voiding Method                   External Female Catheter











Active Medications





- Current Medications


Current Medications: 














Generic Name Dose Route Start Last Admin





  Trade Name Freq  PRN Reason Stop Dose Admin


 


Acetaminophen  650 mg  06/26/22 05:20 





  Acetaminophen 325 Mg Tab  PO  





  Q4H PRN  





  Pain MILD(1-3)/Fever >100.5/HA  


 


Albuterol  2.5 mg  06/26/22 20:00  06/28/22 09:48





  Albuterol 2.5 Mg/3 Ml Nebu  IH   2.5 mg





  Q6HRT BRANDY   Administration


 


Arformoterol Tartrate  15 mcg  06/27/22 20:00  06/28/22 09:48





  Arformoterol 15 Mcg/2 Ml Nebu  IH   15 mcg





  Q12HRT BRANDY   Administration


 


Aspirin  81 mg  06/28/22 10:00  06/28/22 09:27





  Aspirin Ec 81 Mg Tab  PO   81 mg





  DAILY BRANDY   Administration


 


Atorvastatin Calcium  40 mg  06/28/22 10:00  06/28/22 09:27





  Atorvastatin 40 Mg Tab  PO   40 mg





  QDAY BRANDY   Administration


 


Benzonatate  100 mg  06/27/22 14:00  06/28/22 05:09





  Benzonatate 100 Mg Cap  PO   Not Given





  Q8HR BRANDY  


 


Budesonide  0.5 mg  06/27/22 20:00  06/28/22 09:48





  Budesonide 0.5 Mg/2 Ml Nebu  IH   0.5 mg





  Q12HRT BRANDY   Administration


 


Furosemide  40 mg  06/26/22 06:00  06/28/22 05:02





  Furosemide 40 Mg/4 Ml Inj  IV   40 mg





  BID@0600,1800 BRANDY   Administration


 


Magnesium Hydroxide  30 ml  06/26/22 05:20 





  Magnesium Hydroxide (Mom) Oral Liqd Udc  PO  





  Q4H PRN  





  Constipation  


 


Methylprednisolone Sodium Succinate  40 mg  06/28/22 04:00  06/28/22 04:11





  Methylprednisolone Sod Succinate 40 Mg/1 Ml Inj  IV   40 mg





  Q12H BRANDY   Administration


 


Morphine Sulfate  2 mg  06/26/22 05:20 





  Morphine 2 Mg/1 Ml Inj  IV  





  Q4H PRN  





  Pain, Moderate (4-6)  


 


Morphine Sulfate  4 mg  06/26/22 05:20 





  Morphine 4 Mg/1 Ml Inj  IV  





  Q4H PRN  





  Pain , Severe (7-10)  


 


Ondansetron HCl  4 mg  06/26/22 05:20 





  Ondansetron 4 Mg/2 Ml Inj  IV  





  Q8H PRN  





  Nausea And Vomiting  


 


Rivaroxaban  20 mg  06/28/22 17:00 





  Rivaroxaban 20 Mg Tab  PO  





  QPMDIAB [facility]  





  Protocol  


 


Sertraline HCl  100 mg  06/28/22 10:00  06/28/22 09:27





  Sertraline 100 Mg Tab  PO   100 mg





  DAILY BRANDY   Administration


 


Sodium Chloride  10 ml  06/26/22 10:00  06/28/22 09:27





  Sodium Chloride 0.9% 10 Ml Flush Syringe  IV   10 ml





  BID BRANDY   Administration


 


Sodium Chloride  10 ml  06/26/22 05:20 





  Sodium Chloride 0.9% 10 Ml Flush Syringe  IV  





  PRN PRN  





  LINE FLUSH  


 


Zolpidem Tartrate  10 mg  06/27/22 22:00  06/27/22 21:04





  Zolpidem 5 Mg Tab  PO   10 mg





  QHS BRANDY   Administration

## 2022-06-28 NOTE — PROGRESS NOTE
Assessment and Plan





- Patient Problems


(1) Respiratory insufficiency


Status: Acute   


Plan to address problem: 


Patient presents with respiratory insufficiency, due to exacerbation of COPD.  

She has chronic right pleural effusion, and chronic atrial fibrillation.  With 

respect to atrial fibrillation, is on rate control strategy and chronic 

anticoagulation.  Cardiogram on this presentation showed normal left ventricular

systolic function, ejection fraction 60 to 65%.





We will continue rate management of atrial fibrillation and plan for continued 

long-term oral anticoagulation.  Follow intermittently.








Subjective


Date of service: 06/28/22


Principal diagnosis: AHRF; R. Pleural Effusion Indwelling Pleurx catheter; AE-

CHF; P.E.; AE-COPD


Interval history: 





Patient is on bedrest in the ICU, asleep, on CPAP.  No new cardiac events 

reported.  On telemetry monitor, she has atrial fibrillation, with ventricular 

rate in the 90s.





Objective


                                   Vital Signs











  Temp Pulse Pulse Resp Resp BP Pulse Ox


 


 06/28/22 11:28   120 H     


 


 06/28/22 11:00   118 H   19   122/76  95


 


 06/28/22 10:00   107 H   15   133/86  98


 


 06/28/22 09:52        98


 


 06/28/22 09:48    115 H   20  


 


 06/28/22 09:00   111 H   17   125/80  95


 


 06/28/22 08:00   120 H   19   136/83  96


 


 06/28/22 07:23  98.9 F      


 


 06/28/22 07:19   99 H     


 


 06/28/22 07:00   100 H   18   140/90  98


 


 06/28/22 06:00   101 H   16   124/82  96


 


 06/28/22 05:15   97 H   17    97


 


 06/28/22 05:00   98 H   13   123/80  95


 


 06/28/22 04:00  98.9 F  97 H   17   116/84  95


 


 06/28/22 03:11   101 H      99


 


 06/28/22 03:00   106 H   16   118/91  96


 


 06/28/22 02:00   95 H   17   105/69  94


 


 06/28/22 01:00   102 H   18   107/67  95


 


 06/28/22 00:00  98.0 F  97 H   15   103/61  95


 


 06/27/22 23:43        99


 


 06/27/22 23:42   99 H     


 


 06/27/22 23:21   113 H   24   114/71  95


 


 06/27/22 23:00   106 H   17   114/71  94


 


 06/27/22 22:00   102 H   16   124/78 


 


 06/27/22 21:53   111 H   20   131/87  98


 


 06/27/22 21:00   99 H  109 H  18  20  131/87  96


 


 06/27/22 20:00  99.3 F  103 H   18   117/80  96


 


 06/27/22 19:09        99


 


 06/27/22 19:08   107 H     


 


 06/27/22 19:00   115 H   17   127/80  96


 


 06/27/22 18:00   109 H   17   127/80  96


 


 06/27/22 17:00   104 H   23   116/82  92


 


 06/27/22 16:00   88   16   116/82  97


 


 06/27/22 15:46  97.3 F L  89   19    99


 


 06/27/22 15:00   101 H   29 H   110/80  99


 


 06/27/22 14:40   110 H   19   119/83  100


 


 06/27/22 14:37    111 H   24  


 


 06/27/22 14:00   93 H   17   119/83  100


 


 06/27/22 13:00   104 H   18   99/71  80 L


 


 06/27/22 12:00   92 H   18   99/71  98














- Physical Examination


General: No Apparent Distress, Other (On CPAP)


HEENT: Positive: PERRL


Neck: Positive: neck supple


Cardiac: Positive: irregularly irregular


Lungs: Positive: Decreased Breath Sounds


Neuro: Positive: Grossly Intact


Abdomen: Positive: Soft, Active Bowel Sounds


Skin: Negative: Rash


Extremities: Present: edema (Minimal)





- Labs and Meds


                                       CBC











  06/28/22 Range/Units





  04:21 


 


WBC  3.4 L  (4.5-11.0)  K/mm3


 


RBC  3.87  (3.65-5.03)  M/mm3


 


Hgb  10.4  (10.1-14.3)  gm/dl


 


Hct  33.1  (30.3-42.9)  %


 


Plt Count  139 L  (140-440)  K/mm3








                          Comprehensive Metabolic Panel











  06/28/22 Range/Units





  04:21 


 


Sodium  142  (137-145)  mmol/L


 


Potassium  3.5 L  (3.6-5.0)  mmol/L


 


Chloride  91.4 L  ()  mmol/L


 


Carbon Dioxide  40 H  (22-30)  mmol/L


 


BUN  13  (7-17)  mg/dL


 


Creatinine  0.5 L  (0.6-1.2)  mg/dL


 


Glucose  139 H  ()  mg/dL


 


Calcium  8.7  (8.4-10.2)  mg/dL














- Allied health notes


Allied health notes reviewed: nursing

## 2022-06-28 NOTE — PROGRESS NOTE
Assessment and Plan


Acute and chronic hypoxemic respiratory failure 


Pleural Effusion (Recurrent)


Indwelling Pleurx catheter (R. Pleural space)


Acute exacerbation of CHF


Pulmonary embolism


AE-COPD


Hypertension


Obesity


Sleep apnea in adult


Obesity with alveolar hypoventilation








-continue  to titrate supplemental oxygen to keep SpO2 89-92%


- continue diuresis while monitoring renal function, hemodynmaics and 

electrolyte profile


- bronchodilators (DEANNE & LABA&ICS) with pulmonary hygiene per RT


- continue accuchecks with glycemic control per SSI (While critically ill target

blood glucose of 140-180 mg/dL; avoid hypoglycemia)


- avoid nephrotoxins, renally dose all medications


- avoid benzodiazepines, reduce the possibility of delirium


- prn analgesia per CPOT score


- Maintenance of sleep-wake cycle, avoid delirium


- VTE prophylaxis- therapeutic Xarelto


- PT/OT, increase activity as tolerated


- continue mobility protocols for pressure ulcer prophylaxis


- Monitor hemodynamics closely


- continue other care per attending / other consultants


- discharge planning ongoing concurrently- she needs home health


Discussed with her the need to follow up within the next week with her community

pulmonologist





Subjective


Date of service: 06/28/22


Principal diagnosis: AHRF; R. Pleural Effusion Indwelling Pleurx catheter; AE-

CHF; P.E.; AE-COPD


Interval history: 





Patient is seen today for: Ac. hypoxemic respiratory failure; Pleural Effusion 

(Recurrent); Indwelling Pleurx catheter (R. Pleural space); AE-CHF; P.E.; AE-

COPD; HTN; KRISTIE / [facility]





Seen and examined at bedside; 24hour events reviewed; nursing and respiratory 

care staff consulted; no adverse overnight events reported to me; resting 

peacefully in bed; feels better overall but still with labored breathing ;  she 

at  home oxygen baseline; denies acute chest pain; denies N/V/F/C; never smoked





Objective


                               Vital Signs - 12hr











  06/28/22 06/28/22 06/28/22





  01:00 02:00 03:00


 


Temperature   


 


Pulse Rate 102 H 95 H 106 H


 


Pulse Rate [   





Bilateral   





Throughout]   


 


Respiratory 18 17 16





Rate   


 


Respiratory   





Rate [Bilateral   





Throughout]   


 


Blood Pressure 107/67 105/69 118/91


 


O2 Sat by Pulse 95 94 96





Oximetry   














  06/28/22 06/28/22 06/28/22





  03:11 04:00 05:00


 


Temperature  98.9 F 


 


Pulse Rate 101 H 97 H 98 H


 


Pulse Rate [   





Bilateral   





Throughout]   


 


Respiratory  17 13





Rate   


 


Respiratory   





Rate [Bilateral   





Throughout]   


 


Blood Pressure  116/84 123/80


 


O2 Sat by Pulse 99 95 95





Oximetry   














  06/28/22 06/28/22 06/28/22





  05:15 06:00 07:00


 


Temperature   


 


Pulse Rate 97 H 101 H 100 H


 


Pulse Rate [   





Bilateral   





Throughout]   


 


Respiratory 17 16 18





Rate   


 


Respiratory   





Rate [Bilateral   





Throughout]   


 


Blood Pressure  124/82 140/90


 


O2 Sat by Pulse 97 96 98





Oximetry   














  06/28/22 06/28/22 06/28/22





  07:19 07:23 08:00


 


Temperature  98.9 F 


 


Pulse Rate 99 H  120 H


 


Pulse Rate [   





Bilateral   





Throughout]   


 


Respiratory   19





Rate   


 


Respiratory   





Rate [Bilateral   





Throughout]   


 


Blood Pressure   136/83


 


O2 Sat by Pulse   96





Oximetry   














  06/28/22 06/28/22 06/28/22





  09:00 09:48 09:52


 


Temperature   


 


Pulse Rate 111 H  


 


Pulse Rate [  115 H 





Bilateral   





Throughout]   


 


Respiratory 17  





Rate   


 


Respiratory  20 





Rate [Bilateral   





Throughout]   


 


Blood Pressure 125/80  


 


O2 Sat by Pulse 95  98





Oximetry   














  06/28/22 06/28/22 06/28/22





  10:00 11:00 11:28


 


Temperature   


 


Pulse Rate 107 H 118 H 120 H


 


Pulse Rate [   





Bilateral   





Throughout]   


 


Respiratory 15 19 





Rate   


 


Respiratory   





Rate [Bilateral   





Throughout]   


 


Blood Pressure 133/86 122/76 


 


O2 Sat by Pulse 98 95 





Oximetry   














  06/28/22 06/28/22





  12:00 12:07


 


Temperature  99.2 F


 


Pulse Rate 124 H 


 


Pulse Rate [  





Bilateral  





Throughout]  


 


Respiratory 46 H 





Rate  


 


Respiratory  





Rate [Bilateral  





Throughout]  


 


Blood Pressure 110/64 


 


O2 Sat by Pulse 93 





Oximetry  











Constitutional: alert, other (elderly obese female with moderately increased re

spiratory effort at rest, resting in chair)


Eyes: non-icteric


ENT: oropharynx moist


Neck: supple, no JVD


Effort: mildly labored


Ascultation: Bilateral: wheezes (Occasional wheezing.), rales, other (R. Pleurx 

catheter)


Percussion: Right: dull (base)


Cardiovascular: regular rate and rhythm, other (S1,S2, systolic murmur)


Gastrointestinal: normoactive bowel sounds, soft, non-tender, non-distended (pr

otuberant)


Integumentary: normal


Extremities: no cyanosis, no edema, no ischemia or petechiae, edema, other (skin

lesions- keratosis)


Neurologic: normal mental status, non-focal exam, pupils equal and round, motor 

strength normal and (but weak)


Psychiatric: mood appropriate, affect normal


CBC and BMP: 


                                 06/28/22 16:40





                                 06/28/22 16:40


ABG, PT/INR, D-dimer: 


ABG











ABG pH  7.412 pH Units (7.350-7.450)   06/25/22  23:40    


 


ABG pCO2  72.6 mm Hg  06/25/22  23:40    


 


ABG pO2  73.9 mm Hg (80.0-90.0)  L  06/25/22  23:40    


 


ABG O2 Saturation  97.3 % (95.0-99.0)   06/25/22  23:40    





PT/INR, D-dimer











D-Dimer  1852.16 ng/mlDDU (0-234)  H  06/25/22  23:41    








Abnormal lab findings: 


                                  Abnormal Labs











  06/25/22 06/25/22 06/25/22





  23:40 23:41 23:41


 


WBC   


 


MCH   27 L 


 


RDW   16.4 H 


 


Plt Count   


 


D-Dimer    1852.16 H


 


ABG pO2  73.9 L  


 


ABG HCO3  45.2 H  


 


ABG Base Excess  17.8 H  


 


ABG Hemoglobin  10.0 L  


 


Potassium   


 


Chloride   


 


Carbon Dioxide   


 


Creatinine   


 


Glucose   


 


NT-Pro-B Natriuret Pep   


 


Total Protein   


 


Albumin   














  06/25/22 06/27/22 06/27/22





  23:41 04:28 04:28


 


WBC   


 


MCH   26 L 


 


RDW   16.8 H 


 


Plt Count   121 L 


 


D-Dimer   


 


ABG pO2   


 


ABG HCO3   


 


ABG Base Excess   


 


ABG Hemoglobin   


 


Potassium    3.5 L


 


Chloride  92.8 L   91.4 L


 


Carbon Dioxide  39 H   43 H*


 


Creatinine    0.4 L


 


Glucose  191 H  


 


NT-Pro-B Natriuret Pep  2950 H  


 


Total Protein  5.6 L  


 


Albumin  3.7 L  














  06/28/22 06/28/22





  04:21 04:21


 


WBC  3.4 L 


 


MCH  27 L 


 


RDW  16.7 H 


 


Plt Count  139 L 


 


D-Dimer  


 


ABG pO2  


 


ABG HCO3  


 


ABG Base Excess  


 


ABG Hemoglobin  


 


Potassium   3.5 L


 


Chloride   91.4 L


 


Carbon Dioxide   40 H


 


Creatinine   0.5 L


 


Glucose   139 H


 


NT-Pro-B Natriuret Pep  


 


Total Protein  


 


Albumin  











Chest x-ray: image reviewed


CT scan - chest: image reviewed (Pulmonary emboli, pleural effusions)


Additional Studies: 


CT angiogram of the chest shows pulmonary emboli within the subsegmental 

branches of the left upper lobe, small to moderate right pleural effusion with 

adjacent atelectasis and indwelling catheter, cardiomegaly small amount of 

perihepatic and perisplenic fluid.





Allied health notes reviewed: RT

## 2022-06-29 VITALS — DIASTOLIC BLOOD PRESSURE: 65 MMHG | SYSTOLIC BLOOD PRESSURE: 112 MMHG

## 2022-06-29 RX ADMIN — ASPIRIN SCH MG: 81 TABLET, COATED ORAL at 09:54

## 2022-06-29 RX ADMIN — BUDESONIDE SCH: 0.5 INHALANT RESPIRATORY (INHALATION) at 00:12

## 2022-06-29 RX ADMIN — METHYLPREDNISOLONE SODIUM SUCCINATE SCH MG: 40 INJECTION, POWDER, FOR SOLUTION INTRAMUSCULAR; INTRAVENOUS at 04:30

## 2022-06-29 RX ADMIN — ALBUTEROL SULFATE SCH MG: 2.5 SOLUTION RESPIRATORY (INHALATION) at 09:16

## 2022-06-29 RX ADMIN — ALBUTEROL SULFATE SCH MG: 2.5 SOLUTION RESPIRATORY (INHALATION) at 03:00

## 2022-06-29 RX ADMIN — ALBUTEROL SULFATE SCH: 2.5 SOLUTION RESPIRATORY (INHALATION) at 00:12

## 2022-06-29 RX ADMIN — Medication SCH ML: at 09:54

## 2022-06-29 RX ADMIN — FUROSEMIDE SCH MG: 10 INJECTION, SOLUTION INTRAVENOUS at 06:31

## 2022-06-29 RX ADMIN — BUDESONIDE SCH MG: 0.5 INHALANT RESPIRATORY (INHALATION) at 09:16

## 2022-06-29 RX ADMIN — ARFORMOTEROL TARTRATE SCH: 15 SOLUTION RESPIRATORY (INHALATION) at 00:08

## 2022-06-29 RX ADMIN — BENZONATATE SCH MG: 100 CAPSULE ORAL at 06:31

## 2022-06-29 RX ADMIN — ARFORMOTEROL TARTRATE SCH MCG: 15 SOLUTION RESPIRATORY (INHALATION) at 09:17

## 2022-06-29 RX ADMIN — SERTRALINE SCH MG: 100 TABLET, FILM COATED ORAL at 09:54

## 2022-06-29 NOTE — PROGRESS NOTE
Assessment and Plan





77-year-old white female with known history of CHF, COPD, coronary artery 

disease and hyperlipidemia, GERD  presents to the emergency room via EMS for 

shortness of breath.  Oxygen saturation was about 85% upon arrival EMS and 

patient was subsequently placed on CPAP in route to the hospital.


Upon arrival in the emergency room patient was switched to BiPAP. Patient also 

has history of Sleep apnea. She uses CPAP at home.


She denies any fever or chills, no chest pain, no nausea vomiting, no abdominal 

pain.  Denies any headache or dizziness and denies any diaphoresis.


Patient has history of Anxiety and depression. 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

asthma. Patient has no history of smoking, alcohol or drug abuse. Patient works 

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  and has three children.





Daughter was by the bedside indicated that patient was just discharged from 

[facility] few days ago after being admitted for pleural effusion and CHF 

exacerbation.  Patient thoracentesis done while at [facility].


Patient was also placed on a course of oral antibiotics after discharge from 

[facility].





Work-up in the ER , chest x-ray shows no significant change.





CT angiogram of the chest shows pulmonary emboli within the subsegmental 

branches of the left upper lobe, small to moderate right pleural effusion with 

adjacent atelectasis and indwelling catheter, cardiomegaly small amount of 

perihepatic and perisplenic fluid.


Lab reveals BNP of 2950 D-dimer 1852.





Patient still complaining some shortness of breath.Patient has been commenced on

anticoagulation and diuretics.





Patient is on 3 litres O2. O2 saturation 100%.Patient afebrile. No leukocytosis.

Blood pressure 109/67, Pulse 90, Respirations 14.





I spent critical care time of 50 minutes, obtaining history, review the chart, 

Examine the patient , review xrays and lab results, talking to respiratory 

therapy and nursing staff and work up plan of treatment in this critically ill 

patient.





- Patient Problems


(1) Acute and chronic respiratory failure with hypoxia


Current Visit: No   Status: Acute   


Plan to address problem: 


O2 3 litres via nasal canula 


 CPAP during night time.


 S/C Lovenox 100 mg  q 12 hours.


 Recommend albuterol inhaler 2 puffs po qid.








(2) Acute exacerbation of CHF (congestive heart failure)


Current Visit: Yes   Status: Acute   


Plan to address problem: 


Patient is on Lasix.


 Management as per cardiology.








(3) Pulmonary embolism


Current Visit: Yes   Status: Acute   


Plan to address problem: 


Patient started on S/c Lovenox 100 mg q 12 hours.








(4) Acute exacerbation of COPD with asthma


Current Visit: No   Status: Acute   


Plan to address problem: 


Likely asthma.


Patient has no history of smoking or Occupational [employer].


Recommend Albuterol inhaler 2 puffs po qid.


Recommend Alpha1 antitrypsin level.


PFTs as out patient.








(5) Hypertension


Current Visit: No   Status: Chronic   


Plan to address problem: 


Management as per primary care.








(6) Obesity


Current Visit: No   Status: Chronic   


Plan to address problem: 


Counseled to loose weight.


 Diet and Exercise.








(7) Sleep apnea in adult


Current Visit: Yes   Status: Acute   


Plan to address problem: 


CPAP as she is using at home.








(8) Obesity with alveolar hypoventilation


Current Visit: Yes   Status: Acute   


Plan to address problem: 


Recocommend BIPAP  16/6, rate 20, FIO2 35%


 Recommend to loose weight.


 Recommend to repeat sleep study, since we donot have any results from previous 

sleep study.


 Recommend T4, Free T4 and TSH.








Subjective


Date of service: 06/29/22


Principal diagnosis: AHRF; R. Pleural Effusion Indwelling Pleurx catheter; AE-

CHF; P.E.; AE-COPD


Interval history: 





77-year-old white female with known history of CHF, COPD, coronary artery 

disease and hyperlipidemia, GERD  presents to the emergency room via EMS for 

shortness of breath.  Oxygen saturation was about 85% upon arrival EMS and 

patient was subsequently placed on CPAP in route to the hospital.


Upon arrival in the emergency room patient was switched to BiPAP. Patient also 

has history of Sleep apnea. She uses CPAP at home.


She denies any fever or chills, no chest pain, no nausea vomiting, no abdominal 

pain.  Denies any headache or dizziness and denies any diaphoresis.


Patient has history of Anxiety and depression. 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

asthma. Patient has no history of smoking, alcohol or drug abuse. Patient works 

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





Daughter was by the bedside indicated that patient was just discharged from 

[facility] few days ago after being admitted for pleural effusion and CHF 

exacerbation.  Patient thoracentesis done while at [facility].


Patient was also placed on a course of oral antibiotics after discharge from 

[facility].





Work-up in the ER , chest x-ray shows no significant change.





CT angiogram of the chest shows pulmonary emboli within the subsegmental b

ranches of the left upper lobe, small to moderate right pleural effusion with 

adjacent atelectasis and indwelling catheter, cardiomegaly small amount of 

perihepatic and perisplenic fluid.


Lab reveals BNP of 2950 D-dimer 1852.





Patient still complaining some shortness of breath.Patient has been commenced on

anticoagulation and diuretics.





Patient is on 3 litres O2. O2 saturation 100%.Patient afebrile. No leukocytosis.

Blood pressure 109/67, Pulse 90, Respirations 14.





Objective


                               Vital Signs - 12hr











  06/28/22 06/28/22 06/28/22





  20:00 21:00 21:20


 


Temperature 98.4 F  


 


Pulse Rate 108 H 89 


 


Pulse Rate [   





Bilateral   





Throughout]   


 


Respiratory 13  





Rate   


 


Respiratory   





Rate [Bilateral   





Throughout]   


 


Blood Pressure 122/66 122/76 


 


O2 Sat by Pulse 97 97 97





Oximetry   














  06/28/22 06/28/22 06/28/22





  21:23 22:00 23:00


 


Temperature   


 


Pulse Rate 97 H 96 H 103 H


 


Pulse Rate [   





Bilateral   





Throughout]   


 


Respiratory   





Rate   


 


Respiratory   





Rate [Bilateral   





Throughout]   


 


Blood Pressure 122/76 124/81 137/94


 


O2 Sat by Pulse 96 91 96





Oximetry   














  06/28/22 06/28/22 06/29/22





  23:50 23:57 00:00


 


Temperature   98.4 F


 


Pulse Rate 100 H 105 H 97 H


 


Pulse Rate [   





Bilateral   





Throughout]   


 


Respiratory 21  





Rate   


 


Respiratory   





Rate [Bilateral   





Throughout]   


 


Blood Pressure 136/88  136/88


 


O2 Sat by Pulse 96  94





Oximetry   














  06/29/22 06/29/22 06/29/22





  01:00 02:00 03:00


 


Temperature   


 


Pulse Rate 106 H 106 H 


 


Pulse Rate [   95 H





Bilateral   





Throughout]   


 


Respiratory   





Rate   


 


Respiratory   18





Rate [Bilateral   





Throughout]   


 


Blood Pressure 124/82 126/94 


 


O2 Sat by Pulse 95 95 





Oximetry   














  06/29/22 06/29/22 06/29/22





  03:01 04:00 05:00


 


Temperature  98.4 F 


 


Pulse Rate 97 H 90 98 H


 


Pulse Rate [   





Bilateral   





Throughout]   


 


Respiratory   





Rate   


 


Respiratory   





Rate [Bilateral   





Throughout]   


 


Blood Pressure 137/79 123/81 125/79


 


O2 Sat by Pulse  97 92





Oximetry   














  06/29/22 06/29/22 06/29/22





  06:00 06:30 07:24


 


Temperature   97.7 F


 


Pulse Rate 99 H 108 H 


 


Pulse Rate [   





Bilateral   





Throughout]   


 


Respiratory   





Rate   


 


Respiratory   





Rate [Bilateral   





Throughout]   


 


Blood Pressure 134/87  


 


O2 Sat by Pulse 95  





Oximetry   











Constitutional: no acute distress, alert, other (elderly obese female with 

moderately increased respiratory effort at rest)


Eyes: non-icteric


ENT: oropharynx moist


Neck: supple, no JVD


Effort: mildly labored


Ascultation: Bilateral: wheezes (Occasional wheezing.), rales, other (R. Pleurx 

catheter)


Percussion: Right: dull (base)


Cardiovascular: regular rate and rhythm


Gastrointestinal: normoactive bowel sounds, soft, non-tender, non-distended 

(protuberant)


Integumentary: normal


Extremities: no cyanosis, no edema, no ischemia or petechiae, edema


Neurologic: normal mental status, non-focal exam, pupils equal and round, motor 

strength normal and


Psychiatric: mood appropriate, affect normal


CBC and BMP: 


                                 06/28/22 16:40





                                 06/28/22 16:40


ABG, PT/INR, D-dimer: 


ABG











ABG pH  7.412 pH Units (7.350-7.450)   06/25/22  23:40    


 


ABG pCO2  72.6 mm Hg  06/25/22  23:40    


 


ABG pO2  73.9 mm Hg (80.0-90.0)  L  06/25/22  23:40    


 


ABG O2 Saturation  97.3 % (95.0-99.0)   06/25/22  23:40    





PT/INR, D-dimer











PT  14.9 Sec. (12.2-14.9)   06/28/22  16:40    


 


INR  1.03  (0.87-1.13)   06/28/22  16:40    


 


D-Dimer  1852.16 ng/mlDDU (0-234)  H  06/25/22  23:41    








Abnormal lab findings: 


                                  Abnormal Labs











  06/25/22 06/25/22 06/25/22





  23:40 23:41 23:41


 


WBC   


 


MCH   27 L 


 


RDW   16.4 H 


 


Plt Count   


 


D-Dimer    1852.16 H


 


ABG pO2  73.9 L  


 


ABG HCO3  45.2 H  


 


ABG Base Excess  17.8 H  


 


ABG Hemoglobin  10.0 L  


 


Potassium   


 


Chloride   


 


Carbon Dioxide   


 


Creatinine   


 


Glucose   


 


NT-Pro-B Natriuret Pep   


 


Total Protein   


 


Albumin   














  06/25/22 06/27/22 06/27/22





  23:41 04:28 04:28


 


WBC   


 


MCH   26 L 


 


RDW   16.8 H 


 


Plt Count   121 L 


 


D-Dimer   


 


ABG pO2   


 


ABG HCO3   


 


ABG Base Excess   


 


ABG Hemoglobin   


 


Potassium    3.5 L


 


Chloride  92.8 L   91.4 L


 


Carbon Dioxide  39 H   43 H*


 


Creatinine    0.4 L


 


Glucose  191 H  


 


NT-Pro-B Natriuret Pep  2950 H  


 


Total Protein  5.6 L  


 


Albumin  3.7 L  














  06/28/22 06/28/22 06/28/22





  04:21 04:21 16:40


 


WBC  3.4 L  


 


MCH  27 L  


 


RDW  16.7 H   17.0 H


 


Plt Count  139 L  


 


D-Dimer   


 


ABG pO2   


 


ABG HCO3   


 


ABG Base Excess   


 


ABG Hemoglobin   


 


Potassium   3.5 L 


 


Chloride   91.4 L 


 


Carbon Dioxide   40 H 


 


Creatinine   0.5 L 


 


Glucose   139 H 


 


NT-Pro-B Natriuret Pep   


 


Total Protein   


 


Albumin   











Allied health notes reviewed: nursing

## 2022-06-29 NOTE — DISCHARGE SUMMARY
Providers





- Providers


Date of Admission: 


06/26/22 06:07





Date of discharge: 06/29/22


Attending physician: 


OWEN AGUILAR MD





                                        





06/26/22 05:20


Consult to Physician [CONS] Routine 


   Comment: dr Blanco/ felisa


   Consulting Provider: MIKO OSORIO


   Physician Instructions: 


   Reason For Exam: CHF Exac.


Consult to Physician [CONS] Routine 


   Comment: farideh/ felisa


   Consulting Provider: ARTI CRONIN


   Physician Instructions: 


   Reason For Exam: Respiratory failure- CHF exac, pleural effusion











Primary care physician: 


PATRICA CASTELLANO








Hospitalization


Reason for admission: Shortness of breath


Condition: Stable


Hospital course: 





77-year-old white female with known history of CHF, COPD, coronary artery 

disease and hyperlipidemia presents to the emergency room via EMS today for 

shortness of breath.  Oxygen saturation was about 85% upon arrival EMS and 

patient was subsequently placed on CPAP in route to the hospital.


Upon arrival in the emergency room patient was switched to BiPAP.


She denies any fever or chills, no chest pain, no nausea vomiting, no abdominal 

pain.  Denies any headache or dizziness and denies any diaphoresis.





Daughter was by the bedside indicates that patient was just discharged from 

[facility] few days ago after being admitted for pleural effusion and CHF 

exacerbation.  Patient thoracentesis done while at [facility].


Patient was also placed on a course of oral antibiotics after discharge from 

[facility].





Work-up in the ER today, chest x-ray shows no significant change.





CT angiogram of the chest shows pulmonary emboli within the subsegmental 

branches of the left upper lobe, small to moderate right pleural effusion with 

adjacent atelectasis and indwelling catheter, cardiomegaly small amount of 

perihepatic and perisplenic fluid.


Lab reveals BNP of 2950 D-dimer 1852.





Patient has been commenced on anticoagulation and diuretics.








Assessment:


1.  Acute respiratory failurepossibly secondary to CHF/COPD/pulmonary embolism





2.  Acute on chronic diastolic CHF exacerbation





3.  Pulmonary emboli





4.  Hypertension





5.  COPD with acute exacerbation





6.  Chronic atrial fibrillation





7.  Recurrent pleural effusion status post right-sided indwelling Pleurx 

catheter





8.  Morbid obesity with obesity related hypoventilation





Plan:





Patient was admitted for acute respiratory failure secondary to pulmonary 

embolism which was identified this admission.  She does take Eliquis as an 

outpatient for stroke prophylaxis for her atrial fibrillation.  Patient was 

changed over to Xarelto.  She also has a history of chronic diastolic congestive

 heart failure as well as COPD.  Patient utilizes 3 L/min home oxygen.  She has 

a history of Pleurx catheter placement on the right for recurrent pleural 

effusions.  She states that this was worked up in the past and has been negative

 for malignancy.  This admission her heart rate was controlled with her rate 

control medication.  She was seen here approximately 1.5 weeks ago for similar 

complaint of shortness of breath and A. fib RVR.  At the time she was started on

 amiodarone and diltiazem.  Discussed with cardiology who stated to resume only 

diltiazem.  Echocardiogram completed this admission was stable.  Patient will be

 discharged home with prescription for Xarelto and instructions to resume her 

home medications except for Eliquis and amiodarone.  She was advised to follow-

up with her outpatient cardiologist, pulmonologist, primary care doctor ASAP.





Disposition: 01 HOME / SELF CARE / HOMELESS


Final Discharge Diagnosis (Prints w/discharge instructions): 1.  Acute 

respiratory failurepossibly secondary to CHF/COPD/pulmonary embolism.  2.  

Acute on chronic diastolic CHF exacerbation.  3.  Pulmonary emboli.  4.  

Hypertension.  5.  COPD with acute exacerbation.  6.  Chronic atrial 

fibrillation.  7.  Recurrent pleural effusion status post right-sided indwelling

 Pleurx catheter.  8.  Morbid obesity with obesity related hypoventilation


Time spent for discharge: 35





Core Measure Documentation





- Palliative Care


Palliative Care/ Comfort Measures: Not Applicable





- Core Measures


Any of the following diagnoses?: DVT/PE





- VTE Discharge Requirements


Deep Vein Thrombosis/Pulmonary Embolism Present on Admission: No


Has pt received <5 days of overlap therapy or INR<2.0: Yes


Anticoagulant overlap therapy prescribed at discharge: Yes





Exam





- Physical Exam


Narrative exam: 





VITAL SIGNS:  Reviewed.    


GENERAL:  The patient appears normally developed, chronically ill-appearing 

mildly respiratory distress vital signs as documented.


HEAD:  No signs of head trauma.


EYES:  Pupils are equal.  Extraocular motions intact.  


EARS:  Hearing grossly intact.


MOUTH:  Oropharynx is normal. 


NECK:  No adenopathy, no JVD.  


CHEST: Right chest wall Pleure-vac catheter chest with crackles breath sounds 

bilaterally.  No wheezes,.  Improved lung exam


CARDIAC: Irregularly irregular rate and rhythm.  S1 and S2, without murmurs, 

gallops, or rubs.


VASCULAR: 3+ edema, improved.  Peripheral pulses normal and equal in all 

extremities.


ABDOMEN:  Soft, non tender and non distended.  No   rebound or guarding, and no 

masses palpated.   Bowel Sounds normal.


MUSCULOSKELETAL:  Good range of motion of all major joints. Extremities without 

clubbing, cyanosis.  3+  edema.  


NEUROLOGIC EXAM:  Alert and oriented x 3   No focal sensory or strength 

deficits.   Speech normal.  Follows commands.


PSYCHIATRIC:  Mood normal.


SKIN:  detail exam as documented in skin assessment





- Constitutional


Vitals: 


                                        











Temp Pulse Resp BP Pulse Ox


 


 97.7 F   108 H  18   134/87   95 


 


 06/29/22 07:24  06/29/22 06:30  06/29/22 03:00  06/29/22 06:00  06/29/22 06:00














Plan


Follow up with: 


PATRICA CASTELLANO MD [Primary Care Provider] - 3-5 Days


Prescriptions: 


Rivaroxaban [Xarelto] 20 mg PO QDAY 90 Days #90 tab